# Patient Record
(demographics unavailable — no encounter records)

---
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
## 2018-06-14 NOTE — CT
CT OF CHEST WITHOUT CONTRAST:

 

Date:  06/14/18 

 

COMPARISON:  

12/20/16. 

 

HISTORY:  

COPD and personal history of nicotine dependence. Patient quit smoking 12 years ago. 

 

TECHNIQUE:  

Multiple contiguous axial images were obtained in a CT of the chest without contrast per low dose can
cer screening protocol. Sagittal and coronal reformats were performed. 

 

FINDINGS:

Emphysematous changes are seen throughout the lungs. No pulmonary nodules are identified. Scarring is
 seen in the right lung base. No pneumothorax or pleural effusions are seen. 

 

The heart is normal in size. Calcifications are seen in the coronary arteries and aorta. No hilar or 
mediastinal lymphadenopathy are seen. 

 

Degenerative changes are seen in the spine. The visualized subdiaphragmatic structures are unremarkab
le. The chest wall soft tissues are unremarkable. 

 

IMPRESSION: 

Lung-RADS Category 1 - Negative. 

 

 

POS: [facility]
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
## 2018-07-07 NOTE — RAD
CHEST 1 VIEW:

 

History 

Fever.

 

COMPARISON: 

12/1/16.

 

FINDINGS: 

Atherosclerosis of the aorta.  Normal cardiac silhouette.  The pulmonary vessels and pulmonary hilum 
are normal.  Costophrenic angles are clear.  Hyperinflation, without consolidation or mass.  No pneum
othorax or osseous abnormalities.

 

IMPRESSION: 

Atherosclerosis.  No acute cardiopulmonary process.

 

POS: [facility]

## 2018-07-08 NOTE — PDOC.PN
- Subjective


Encounter Start Date: 07/08/18


Encounter Start Time: 11:45


Subjective: pt up in chair family at bedside. pt is back to her baseline





- Objective


Resuscitation Status: 


 











Resuscitation Status           FULL:Full Resuscitation














Vital Signs & Weight: 


 Vital Signs (12 hours)











  Temp Pulse Resp BP BP Pulse Ox Pulse Ox


 


 07/08/18 14:33   91  16    95 


 


 07/08/18 14:19   91   154/77 H   


 


 07/08/18 10:50   91   136/75   


 


 07/08/18 10:29   91  16    95 


 


 07/08/18 09:40   80   136/75   


 


 07/08/18 08:35        100


 


 07/08/18 08:00  98.5 F  80  16    95 


 


 07/08/18 06:54       94 L 


 


 07/08/18 06:48   80  16    94 L 


 


 07/08/18 04:20  98.3 F  93  18   112/59 L  93 L 














  Pulse Ox


 


 07/08/18 14:33 


 


 07/08/18 14:19 


 


 07/08/18 10:50 


 


 07/08/18 10:29 


 


 07/08/18 09:40 


 


 07/08/18 08:35  95


 


 07/08/18 08:00 


 


 07/08/18 06:54 


 


 07/08/18 06:48 


 


 07/08/18 04:20 











I&O: 


 











 07/07/18 07/08/18 07/09/18





 06:59 06:59 06:59


 


Intake Total  1350 


 


Balance  1350 











Result Diagrams: 


 07/08/18 04:11





 07/08/18 04:11





Phys Exam





- Physical Examination


HEENT: PERRLA, moist MMs, sclera anicteric, TM's clear, oral pharynx no lesions

, 2+ tonsils


Neck: no nodes, no JVD, supple, full ROM


Respiratory: no wheezing, no rales, no rhonchi, wheezing present, clear to 

auscultation bilateral


Cardiovascular: RRR, no significant murmur, no rub, gallop, irregular


Gastrointestinal: soft, non-tender, no distention, positive bowel sounds


Musculoskeletal: no edema, pulses present, edema present





Dx/Plan


(1) Metabolic encephalopathy


Code(s): G93.41 - METABOLIC ENCEPHALOPATHY   Status: Acute   





(2) Fever


Code(s): R50.9 - FEVER, UNSPECIFIED   Status: Acute   





(3) Pneumonia


Code(s): J18.9 - PNEUMONIA, UNSPECIFIED ORGANISM   Status: Acute   





- Plan


pt feels much better today


-: will continue abx


-: ?viral etiology


-: will await echo if normal will discharge in am





* .








Review of Systems





- Review of Systems


ENT: negative: Ear Pain, Ear Discharge, Nose Pain, Nose Discharge, Nose 

Congestion, Mouth Pain, Mouth Swelling, Throat Pain, Throat Swelling, Other


Respiratory: negative: Cough, Dry, Shortness of Breath, Hemoptysis, SOB with 

Excertion, Pleuritic Pain, Sputum, Wheezing


Cardiovascular: negative: chest pain, palpitations, orthopnea, paroxysmal 

nocturnal dyspnea, edema, light headedness, other


Gastrointestinal: negative: Nausea, Vomiting, Abdominal Pain, Diarrhea, 

Constipation, Melena, Hematochezia, Other


Genitourinary: negative: Dysuria, Frequency, Incontinence, Hematuria, Retention

, Other





- Medications/Allergies


Allergies/Adverse Reactions: 


 Allergies











Allergy/AdvReac Type Severity Reaction Status Date / Time


 


Iodine and Iodide Containing Allergy Severe SEVERE Verified 05/25/17 13:52





Produc   ITCHING  


 


shellfish derived Allergy Severe SEVERE Verified 05/25/17 13:52





   VOMITING  











Medications: 


 Current Medications





Acetaminophen (Tylenol)  650 mg PO Q4H PRN


   PRN Reason: Headache/Fever or Pain


   Last Admin: 07/08/18 01:10 Dose:  650 mg


Albuterol/Ipratropium (Duoneb)  3 ml NEB A2AC-FU [facility]


   Last Admin: 07/08/18 14:33 Dose:  3 ml


Apixaban (Eliquis)  5 mg PO BID [facility]


   Last Admin: 07/08/18 09:40 Dose:  5 mg


Aspirin (Ecotrin)  81 mg PO DAILY [facility]


   Last Admin: 07/08/18 09:40 Dose:  81 mg


Famotidine (Pepcid)  20 mg PO DAILY [facility]


   Last Admin: 07/08/18 09:41 Dose:  20 mg


Hydralazine HCl (Apresoline)  25 mg PO TID [facility]


   Last Admin: 07/08/18 14:19 Dose:  25 mg


Ceftriaxone Sodium 1 gm/ (Sodium Chloride)  100 mls @ 200 mls/hr IVPB 1900 [facility]


   Last Admin: 07/07/18 22:08 Dose:  100 mls


Sodium Chloride (Normal Saline 0.9%)  1,000 mls @ 50 mls/hr IV .Q20H [facility]


   Stop: 07/08/18 15:29


   Last Admin: 07/07/18 20:35 Dose:  Not Given


Azithromycin 500 mg/ Sodium (Chloride)  250 mls @ 250 mls/hr IVPB Q24HR [facility]


   Last Admin: 07/07/18 23:17 Dose:  250 mls


Levothyroxine Sodium (Synthroid)  50 mcg PO [facility]


   Last Admin: 07/07/18 22:10 Dose:  50 mcg


Pravastatin Sodium (Pravachol)  40 mg PO [facility]


   Last Admin: 07/07/18 22:10 Dose:  40 mg


Senna (Senokot)  2 tab PO HSPRN PRN


   PRN Reason: Constipation


Sotalol HCl (Betapace)  80 mg PO BID [facility]


   Last Admin: 07/08/18 10:50 Dose:  80 mg
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
## 2018-07-09 NOTE — PDOC.PN
- Subjective


Encounter Start Date: 07/09/18


Encounter Start Time: 12:30


Subjective: pt up in bed felt weak this am





- Objective


Resuscitation Status: 


 











Resuscitation Status           FULL:Full Resuscitation














Vital Signs & Weight: 


 Vital Signs (12 hours)











  Temp Pulse Resp BP BP Pulse Ox


 


 07/09/18 20:37   93   168/98 H  


 


 07/09/18 18:35   99  12   


 


 07/09/18 16:20  98.3 F  92  19   145/78 H  96


 


 07/09/18 15:00   89   133/88  


 


 07/09/18 14:20   70  14   


 


 07/09/18 13:00  97.9 F     


 


 07/09/18 11:45  101.2 F H     


 


 07/09/18 10:38   95  18    92 L








 Weight











Admit Weight                   129 lb


 


Weight                         129 lb














I&O: 


 











 07/08/18 07/09/18 07/10/18





 06:59 06:59 06:59


 


Intake Total 1350 940 480


 


Balance 1350 940 480











Result Diagrams: 


 07/08/18 04:11





 07/08/18 04:11





Phys Exam





- Physical Examination


HEENT: PERRLA, moist MMs, sclera anicteric, TM's clear, oral pharynx no lesions

, 2+ tonsils


Neck: no nodes, no JVD, supple, full ROM


Respiratory: no wheezing, no rales, no rhonchi, wheezing present, clear to 

auscultation bilateral


Cardiovascular: RRR, no significant murmur, no rub, gallop, irregular


Gastrointestinal: soft, non-tender, no distention, positive bowel sounds





Dx/Plan


(1) Metabolic encephalopathy


Code(s): G93.41 - METABOLIC ENCEPHALOPATHY   Status: Acute   





(2) Fever


Code(s): R50.9 - FEVER, UNSPECIFIED   Status: Acute   





(3) Pneumonia


Code(s): J18.9 - PNEUMONIA, UNSPECIFIED ORGANISM   Status: Acute   





(4) Systolic and diastolic CHF, acute


Code(s): I50.41 - ACUTE COMBINED SYSTOLIC AND DIASTOLIC (CONGESTIVE) HRT FAIL   

Status: Acute   





(5) Systolic heart failure


Code(s): I50.20 - UNSPECIFIED SYSTOLIC (CONGESTIVE) HEART FAILURE   Status: 

Acute   





- Plan


per pt's family pt was confused this am


-: she also complained on having weakness all over


-: will consult cardiology and transfer her to monitored floor


-: She did have a temp of 101 but it is very warm in her room and 


-: this was a axillary temp since her oral was normal per nursing





* .


will continue abx for now i do not think this is bacterial infection since her 

procalcitonin is normal. possible viral. 





Review of Systems





- Review of Systems


Constitutional: weakness


Respiratory: Cough


Cardiovascular: negative: chest pain, palpitations, orthopnea, paroxysmal 

nocturnal dyspnea, edema, light headedness, other


Gastrointestinal: negative: Nausea, Vomiting, Abdominal Pain, Diarrhea, 

Constipation, Melena, Hematochezia, Other


Genitourinary: negative: Dysuria, Frequency, Incontinence, Hematuria, Retention

, Other





- Medications/Allergies


Allergies/Adverse Reactions: 


 Allergies











Allergy/AdvReac Type Severity Reaction Status Date / Time


 


Iodine and Iodide Containing Allergy Severe SEVERE Verified 05/25/17 13:52





Produc   ITCHING  


 


shellfish derived Allergy Severe SEVERE Verified 05/25/17 13:52





   VOMITING  











Medications: 


 Current Medications





Acetaminophen (Tylenol)  650 mg PO Q4H PRN


   PRN Reason: Headache/Fever or Pain


   Last Admin: 07/09/18 20:37 Dose:  650 mg


Albuterol/Ipratropium (Duoneb)  3 ml NEB [id number] [facility]


   Last Admin: 07/09/18 18:35 Dose:  3 ml


Apixaban (Eliquis)  5 mg PO BID [facility]


   Last Admin: 07/09/18 20:37 Dose:  5 mg


Aspirin (Ecotrin)  81 mg PO DAILY [facility]


   Last Admin: 07/09/18 08:08 Dose:  81 mg


Carvedilol (Coreg)  6.25 mg PO BID-[facility]


Famotidine (Pepcid)  20 mg PO DAILY [facility]


   Last Admin: 07/09/18 08:08 Dose:  20 mg


Hydralazine HCl (Apresoline)  25 mg PO TID [facility]


   Last Admin: 07/09/18 20:37 Dose:  25 mg


Azithromycin 500 mg/ Sodium (Chloride)  250 mls @ 250 mls/hr IVPB Q24HR [facility]


   Last Admin: 07/08/18 23:02 Dose:  250 mls


Ceftriaxone Sodium 1 gm/Miscellaneous Medication 1 each/ Sodium Chloride  100 

mls @ 200 mls/hr IVPB 1900 [facility]


   Last Admin: 07/09/18 18:46 Dose:  Not Given


Levothyroxine Sodium (Synthroid)  50 mcg PO HS [facility]


   Last Admin: 07/09/18 20:37 Dose:  50 mcg


Pravastatin Sodium (Pravachol)  40 mg PO HS [facility]


   Last Admin: 07/09/18 20:37 Dose:  40 mg


Senna (Senokot)  2 tab PO HSPRN PRN


   PRN Reason: Constipation

## 2018-07-10 NOTE — PDOC.PN
- Subjective


Encounter Start Date: 07/10/18


Encounter Start Time: 10:00


Subjective: pt up in bed feels very sob





- Objective


Resuscitation Status: 


 











Resuscitation Status           FULL:Full Resuscitation














Vital Signs & Weight: 


 Vital Signs (12 hours)











  Temp Pulse Resp BP Pulse Ox


 


 07/10/18 11:00  97.9 F  94  18  136/97 H  94 L


 


 07/10/18 09:44   89  14   96


 


 07/10/18 08:01  98.7 F  108 H  18  160/95 H  93 L


 


 07/10/18 08:00  98.7 F  70  18  


 


 07/10/18 07:59   81   


 


 07/10/18 06:30   81  12   96


 


 07/10/18 04:00  99.0 F  88  16  125/78  92 L








 Weight











Admit Weight                   129 lb


 


Weight                         120 lb 14.4 oz














I&O: 


 











 07/09/18 07/10/18 07/11/18





 06:59 06:59 06:59


 


Intake Total 940 830 


 


Output Total  520 


 


Balance 940 310 











Result Diagrams: 


 07/08/18 04:11





 07/08/18 04:11





Phys Exam





- Physical Examination


HEENT: PERRLA, moist MMs, sclera anicteric, TM's clear, oral pharynx no lesions

, 2+ tonsils


Neck: no nodes, no JVD, supple, full ROM


mild rhonchi to bases clears with cough


Cardiovascular: RRR, no significant murmur, no rub, gallop, irregular


Gastrointestinal: soft, non-tender, no distention, positive bowel sounds


Musculoskeletal: no edema, pulses present, edema present





Dx/Plan


(1) Metabolic encephalopathy


Code(s): G93.41 - METABOLIC ENCEPHALOPATHY   Status: Acute   





(2) Fever


Code(s): R50.9 - FEVER, UNSPECIFIED   Status: Acute   





(3) Pneumonia


Code(s): J18.9 - PNEUMONIA, UNSPECIFIED ORGANISM   Status: Acute   





(4) Systolic and diastolic CHF, acute


Code(s): I50.41 - ACUTE COMBINED SYSTOLIC AND DIASTOLIC (CONGESTIVE) HRT FAIL   

Status: Acute   





(5) Systolic heart failure


Code(s): I50.20 - UNSPECIFIED SYSTOLIC (CONGESTIVE) HEART FAILURE   Status: 

Acute   





(6) Weight loss


Status: Acute   





- Plan


pt still spiked a temp yest however this could be due to URI


-: no significant pna, ua negative. pt still has a wet cough.


-: will start her on low dose diuretic and heart failure meds


-: spoke with her pcp who stated she was going to sent her to gi for weight 


-: loss. will consult gi. possible viral etiology for her worsening HF? last





* .


last ef was 50-55%





Review of Systems





- Review of Systems


Constitutional: weakness


ENT: negative: Ear Pain, Ear Discharge, Nose Pain, Nose Discharge, Nose 

Congestion, Mouth Pain, Mouth Swelling, Throat Pain, Throat Swelling, Other


Respiratory: negative: Cough, Dry, Shortness of Breath, Hemoptysis, SOB with 

Excertion, Pleuritic Pain, Sputum, Wheezing


Cardiovascular: negative: chest pain, palpitations, orthopnea, paroxysmal 

nocturnal dyspnea, edema, light headedness, other


Gastrointestinal: negative: Nausea, Vomiting, Abdominal Pain, Diarrhea, 

Constipation, Melena, Hematochezia, Other





- Medications/Allergies


Allergies/Adverse Reactions: 


 Allergies











Allergy/AdvReac Type Severity Reaction Status Date / Time


 


Iodine and Iodide Containing Allergy Severe SEVERE Verified 05/25/17 13:52





Produc   ITCHING  


 


shellfish derived Allergy Severe SEVERE Verified 05/25/17 13:52





   VOMITING  











Medications: 


 Current Medications





Acetaminophen (Tylenol)  650 mg PO Q4H PRN


   PRN Reason: Headache/Fever or Pain


   Last Admin: 07/10/18 00:21 Dose:  650 mg


Albuterol/Ipratropium (Duoneb)  3 ml NEB Q4H PRN


   PRN Reason:  SOB


Apixaban (Eliquis)  5 mg PO BID [facility]


   Last Admin: 07/10/18 07:59 Dose:  5 mg


Aspirin (Ecotrin)  81 mg PO DAILY [facility]


   Last Admin: 07/10/18 07:59 Dose:  81 mg


Carvedilol (Coreg)  6.25 mg PO BID-[facility]


   Last Admin: 07/10/18 07:59 Dose:  6.25 mg


Famotidine (Pepcid)  20 mg PO DAILY [facility]


   Last Admin: 07/10/18 07:59 Dose:  20 mg


Hydralazine HCl (Apresoline)  25 mg PO TID [facility]


   Last Admin: 07/10/18 07:59 Dose:  25 mg


Levofloxacin (Levaquin)  500 mg PO 0600 [facility]


Levothyroxine Sodium (Synthroid)  50 mcg PO HS [facility]


   Last Admin: 07/09/18 20:37 Dose:  50 mcg


Lisinopril (Zestril)  10 mg PO BID [facility]


Pravastatin Sodium (Pravachol)  40 mg PO HS [facility]


   Last Admin: 07/09/18 20:37 Dose:  40 mg


Senna (Senokot)  2 tab PO HSPRN PRN


   PRN Reason: Constipation


Sodium Chloride (Flush - Normal Saline)  10 ml IVF Q12HR [facility]


Sodium Chloride (Flush - Normal Saline)  10 ml IVF PRN PRN


   PRN Reason: Saline Flush

## 2018-07-10 NOTE — CON
DATE OF CONSULTATION:  07/09/2018

 

HISTORY OF PRESENT ILLNESS:  Sveta Cuevas is a 78-year-old white female, who is 
followed by Dr. Scott in the past.  In 05/2017, she underwent cardioversion for 
atrial fibrillation/flutter and has been maintained on sotalol.  She was last 
seen in the office on 07/05/2018.  EKG on that day revealed sinus arrhythmia 
with heart rate of 67 per minute.  Echocardiogram in 02/2018, revealed ejection 
fraction of 50% to 55% with probable diastolic dysfunction, mild left atrial 
enlargement, mild mitral regurgitation, mild mitral annular calcification, mild 
tricuspid regurgitation, and mild pulmonic regurgitation.

 

The patient states that she has been having a cough for several weeks and at 
times productive of green colored sputum.  She saw her primary physician on 07/
05/2018 and was prescribed Augmentin.  Her family found her to be more confused 
and not answering the phone on 07/07/2018 and she was taken to the emergency 
room in [place], and had a fever of 102 degrees.  She was transferred here 
for further evaluation.  She does complain of some pleuritic chest pain at 
times with coughing.

 

PAST MEDICAL HISTORY:  History of atrial fibrillation/flutter, hyperlipidemia, 
hypertension, hypothyroidism, chronic kidney disease, prediabetes, arthritis.

 

OPERATIONS:  Brain aneurysm surgery, cataract surgery, and hysterectomy.

 

MEDICATIONS:  Eliquis 5 mg b.i.d. (the patient states she has been compliant 
with this), sotalol 80 mg b.i.d., hydralazine 25 mg t.i.d., pravastatin 40 at 
bedtime, levothyroxine 50 daily, aspirin 81 daily.

 

ALLERGIES:  IODINE which causes pruritus and SHELLFISH.

 

SOCIAL HISTORY:  She states she stopped smoking 12 years ago and smoked 10-12 
packs per day.  She was asked about this multiple times, but continued to state 
she smoked 10-12 packs per day.  She does not drink alcohol.

 

FAMILY HISTORY:  Unremarkable.

 

REVIEW OF SYSTEMS:  As noted above, otherwise negative.

 

PHYSICAL EXAMINATION:

VITAL SIGNS:  Blood pressure 145/78, pulse of 99.

HEENT:  PERRL.

NECK:  Supple.

CHEST:  Clear, without rhonchi or wheezing.

CARDIOVASCULAR:  S1, S2 normal, without any S3 or S4.  The rhythm is 
irregularly irregular.

ABDOMEN:  Normal bowel sounds without tenderness.

EXTREMITIES:  Revealed chronic stasis changes, but no edema.

NEUROLOGIC:  Grossly intact.

 

LABORATORY AND DIAGNOSTIC DATA:  EKG revealed probable atrial flutter with 
heart rate of 97 per minute.  Echocardiogram was technically difficult with 
ejection fraction of 25% to 30%, mildly dilated left atrium, mildly increased 
left ventricular size, thickened aortic valve leaflets, and mild mitral 
regurgitation.  Hemoglobin 13.5, hematocrit 41.7, white count 6400, platelets 
122,000.  Sodium 137, potassium 3.1, chloride 104, carbon dioxide 22, BUN 14, 
creatinine 1.04, glucose 173.  TSH is normal.  Troponin I is normal.  CK-MB is 
normal.  BNP 1034.1.  Chest x-ray revealed no acute cardiopulmonary process.  
Chest CTA revealed no evidence of thromboembolism.  There are emphysematous 
changes.  There are calcifications of the coronary arteries and the aorta.

 

IMPRESSION:

1.  Probable chronic obstructive pulmonary disease exacerbation with increased 
sputum productive of green sputum, but no definite infiltrate seen on chest x-
ray.  She has been febrile up to 102 in [place] and 101.2 here.

2.  Recurrence of atrial flutter.  She apparently underwent cardioversion in 05/
2017 and had maintained sinus rhythm since that time.

3.  Severe left ventricular dysfunction with ejection fraction of 25% to 30%, 
whereas in the past her ejection fraction has been normal.

4.  Former smoker.

5.  Chronic obstructive pulmonary disease.

6.  Hypertension.

7.  Hyperlipidemia.

8.  Chronic kidney disease.

 

PLAN:  She currently is on Rocephin and Zithromax and receiving DuoNebs.  With 
her severe left ventricular dysfunction, I will discontinue the sotalol instead 
place her on carvedilol.  Amiodarone may need to be added.  Eliquis will be 
continued.  We will follow the patient with you.

 

CASSIDY

## 2018-07-10 NOTE — PDOC.CTH
<Kaley Moreno - Last Filed: 07/10/18 15:44>





Cardiology Progress Note





- Subjective





The pt seen and examined.  No overnight events.  No cardiac complaints.  She 

could follow commands; however, remains confused. 





- Objective


 Vital Signs











  Temp Pulse Resp BP Pulse Ox


 


 07/10/18 15:15   94   


 


 07/10/18 11:00  97.9 F  94  18  136/97 H  94 L


 


 07/10/18 09:44   89  14   96


 


 07/10/18 08:01  98.7 F  108 H  18  160/95 H  93 L


 


 07/10/18 08:00  98.7 F  70  18  


 


 07/10/18 07:59   81   


 


 07/10/18 06:30   81  12   96


 


 07/10/18 04:00  99.0 F  88  16  125/78  92 L








 











Admit Weight                   129 lb


 


Weight                         120 lb 14.4 oz














 











 07/09/18 07/10/18 07/11/18





 06:59 06:59 06:59


 


Intake Total 940 830 


 


Output Total  520 


 


Balance 940 310 














- Physical Examination


Lungs: other: (coarses and diminished at bases)


Heart: other: (irregular)


Extremities: other: (1-2+ pitting edema)





- Telemetry


Telemetry Rhythm: Aflutter 80-90s





- Labs


Result Diagrams: 


 07/08/18 04:11





 07/08/18 04:11


 Troponin/CKMB











CK-MB (CK-2)  0.7 ng/mL (0-6.6)   07/07/18  23:31    


 


Troponin I  0.010 ng/mL (< 0.028)   07/07/18  23:31    














- Assessment/Plan





1. Paroxysmal Aflutter - Rate well controlled with Coreg and Sotalol.  On 

Eliquis 5mg BID.  Cont. to monitor on tele.


2. New onset of Systolic HF - Echo on 07/09/18 showed EF 25-30% (which EF 50-55

% in 02/2018); On Coreg 3.125mg BID and Lisinopril 10mg BID; cont. to monitor


3. Low grade temp - No evidence of PNA, UA is negative.   


4. HTN - stable with current med; cont monitor


5. Hyperlipidemia - on Statin


6. Hypothyroidism - On Levothyroxin


7. CKD - stable 


MAR reviewed





Eho on 07/09/18 showed EF 25-30%, mild dilated LA, mildly LVH, mild TR.





Review of Systems





- Review of Systems


Constitutional: reports: no symptoms reported, see HPI


EENTM: reports: no symptoms reported, see HPI


Respiratory: reports: no symptoms reported, see HPI


Cardiac (ROS): reports: no symptoms reported, see HPI


ABD/GI: reports: no symptoms reported, see HPI





<YOLA Scott Sascha - Last Filed: 07/10/18 18:42>





Cardiology Progress Note





- Objective


 Vital Signs











  Temp Pulse Resp BP Pulse Ox


 


 07/10/18 15:15  98.8 F  100  20  135/99 H  98


 


 07/10/18 11:00  97.9 F  94  18  136/97 H  94 L


 


 07/10/18 09:44   89  14   96


 


 07/10/18 08:01  98.7 F  108 H  18  160/95 H  93 L


 


 07/10/18 08:00  98.7 F  70  18  


 


 07/10/18 07:59   81   








 











Admit Weight                   129 lb


 


Weight                         120 lb 14.4 oz














 











 07/09/18 07/10/18 07/11/18





 06:59 06:59 06:59


 


Intake Total 940 830 600


 


Output Total  520 


 


Balance 940 310 600














- Labs


Result Diagrams: 


 07/08/18 04:11





 07/08/18 04:11


 Troponin/CKMB











CK-MB (CK-2)  0.7 ng/mL (0-6.6)   07/07/18  23:31    


 


Troponin I  0.010 ng/mL (< 0.028)   07/07/18  23:31    














- Assessment/Plan





Pt. seen and eval. by me. I agree with the A/P by the NP.Consider cardioversion

## 2018-07-10 NOTE — PQF
CLINICAL DOCUMENTATION IMPROVEMENT CLARIFICATION FORM:  ICD-10 Updated



PLEASE DO AN ADDENDUM TO THE PROGRESS NOTE WITH ANY DOCUMENTATION UPDATES OR 
ADDITIONS AND CARRY THROUGH TO DC SUMMARY.   THANK YOU.



DATE:       7/10/18                                                     ATTN:   
DR. WHITNEY



Please exercise your independent, professional judgment in responding to the 
clarification form. 

Clinical indicators are provided on the bottom of this form for your review



Please check appropriate box(es):

 [  ] Sepsis due to: (Pna, UTI, gangrenous gall bladder, etc.) _________________
__________

                 Due to:  [  ] Device (please specify) _________________________
_____________  

         [  ] Implant   [  ] Graft   [  ] Infusion      

[ x ] SIRS due to non-infectious process (please specify etiology) _____________
____________

[  ] with organ dysfunction     [  ] without organ dysfunction

[  ] Severe sepsis with acute organ dysfunction of: ____________________________
_______

        (Examples: respiratory failure, encephalopathy, acute kidney failure, 
other)

[  ] Septic Shock

[  ] Localized infection without sepsis

[  ] Other diagnosis ___________

[  ] Unable to determine



In addition, please specify:

Present on Admission (POA):  [  x] Yes             [  ] No             [  ] 
Unable to determine



For continuity of documentation, please document condition throughout progress 
notes and discharge summary.  Thank You.



CLINICAL INDICATORS - SIGNS / SYMPTOMS / LABS



INITIAL TEMP IN ER- 102.9 / 102.3

PULSE 108 / 141

LACTIC ACID 2.6 PER H&P

CONFUSION AND LETHARGY PER H&P



RISKS:

PNEUMONIA



TREATMENT:

IV VANCOMYCIN (ER)

IV FLUIDS (ER)

LEVAQUIN (START 7/11)

TELEMETRY MONITORING

BLOOD AND URINE CULTURES



(This form is maintained as a part of the permanent medical record)

 2015 [employer].  All Rights Reserved

[email or web address]    Office:  [phone]



CASSIDY
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
## 2018-07-11 NOTE — PDOC.CTH
<Kaley Moreno - Last Filed: 07/11/18 12:02>





Cardiology Progress Note





- Subjective





The pt seen and examined.  No overnight events.  No cardiac complaints.  She is 

alerted and oriented x4 today. 





- Objective


 Vital Signs











  Temp Pulse Resp BP BP Pulse Ox


 


 07/11/18 11:30  98.6 F  94  18   133/76  96


 


 07/11/18 08:33   89   168/98 H  


 


 07/11/18 08:25  97.9 F  92  18   121/71  100


 


 07/11/18 06:05       90 L


 


 07/11/18 06:00    24 H    87 L


 


 07/11/18 03:12  98.1 F  89  16   126/68  94 L


 


 07/11/18 03:10    16    85 L


 


 07/11/18 00:39       95


 


 07/11/18 00:19  97.9 F  102 H  20   110/60  97








 











Admit Weight                   129 lb


 


Weight                         119 lb 8 oz














 











 07/10/18 07/11/18 07/12/18





 06:59 06:59 06:59


 


Intake Total 830 1220 


 


Output Total 520 400 


 


Balance 310 820 














- Physical Examination


General/Neuro: alert & oriented x3


Neck: no JVD present


Lungs: other: (diminished at bases)


Heart: other: (irregular)


Abdomen: soft


Extremities: other: (No edema)





- Telemetry


Telemetry Rhythm: Aflutter 90-100s





- Labs


Result Diagrams: 


 07/11/18 04:59





 07/11/18 04:59


 Troponin/CKMB











CK-MB (CK-2)  0.7 ng/mL (0-6.6)   07/07/18  23:31    


 


Troponin I  0.010 ng/mL (< 0.028)   07/07/18  23:31    














- Assessment/Plan





1. Paroxysmal Aflutter - Rate well controlled with Coreg and Sotalol.  On 

Eliquis 5mg BID.  Possible cardioversion.  Cont. to monitor on tele.


2. New onset of Systolic HF - Echo on 07/09/18 showed EF 25-30% (which EF 50-55

% in 02/2018); On Coreg 3.125mg BID and Lisinopril 10mg BID; cont. to monitor


3. Low grade temp - No evidence of PNA, UA is negative.   


4. HTN - stable with current med; cont monitor


5. Hyperlipidemia - on Statin


6. Hypothyroidism - On Levothyroxin


7. CKD - stable 


MAR reviewed





Eho on 07/09/18 showed EF 25-30%, mild dilated LA, mildly LVH, mild TR.








Review of Systems





- Review of Systems


Constitutional: reports: no symptoms reported


EENTM: reports: no symptoms reported


Respiratory: reports: no symptoms reported


Cardiac (ROS): reports: no symptoms reported


ABD/GI: reports: no symptoms reported


: reports: no symptoms reported


Musculoskeletal: reports: no symptoms reported


Skin: reports: no symptoms reported





<YOLA Scott - Last Filed: 07/11/18 17:34>





Cardiology Progress Note





- Objective


 Vital Signs











  Temp Pulse Pulse Resp BP BP BP


 


 07/11/18 16:30  97.7 F  103 H   18    119/70


 


 07/11/18 13:47    97    125/82 


 


 07/11/18 11:30  98.6 F  94   18    133/76


 


 07/11/18 08:33   89    168/98 H  


 


 07/11/18 08:25  97.9 F  92   18    121/71


 


 07/11/18 06:05       


 


 07/11/18 06:00     24 H   














  Pulse Ox Pulse Ox


 


 07/11/18 16:30  92 L 


 


 07/11/18 13:47   95


 


 07/11/18 11:30  96 


 


 07/11/18 08:33  


 


 07/11/18 08:25  100 


 


 07/11/18 06:05  90 L 


 


 07/11/18 06:00  87 L 








 











Admit Weight                   129 lb


 


Weight                         119 lb 8 oz














 











 07/10/18 07/11/18 07/12/18





 06:59 06:59 06:59


 


Intake Total 830 1220 


 


Output Total 520 400 


 


Balance 310 820 














- Labs


Result Diagrams: 


 07/11/18 04:59





 07/11/18 04:59


 Troponin/CKMB











CK-MB (CK-2)  0.7 ng/mL (0-6.6)   07/07/18  23:31    


 


Troponin I  0.010 ng/mL (< 0.028)   07/07/18  23:31    














- Assessment/Plan





Pt. seen and eval. by me. I agree with the A/P by the NP. I will discuss with 

EP and see if cardioversion vs ablation would be better. She is feeling better 

each day.


Chest: right basilar rales/rhonchi. 


CV: regular. Monitor : a-flutter.

## 2018-07-11 NOTE — PQF
CLINICAL DOCUMENTATION IMPROVEMENT CLARIFICATION FORM:  ICD-10 Updated



PLEASE DO AN ADDENDUM TO THE PROGRESS NOTE WITH ANY DOCUMENTATION UPDATES OR 
ADDITIONS AND CARRY THROUGH TO DC SUMMARY.   THANK YOU.



Date:           7/11/18                                                 ATTN:  
DR. WHITNEY



Please exercise your independent, professional judgment in responding to the 
clarification form. 

Clinical indicators are provided on the bottom of this form for your review



Please check appropriate box(s):

[  ] Protein Calorie Malnutrition:    [  ] Mild      [ x ] Moderate   [  ] 
Severe   

[  ] Other Malnutrition (please specify) _______________________________________
__

[  ] Underweight without malnutrition

[  ] Cachexia 

[  ] Other diagnosis ___________

[  ] Unable to determine



In addition, please specify:

Present on Admission (POA):  [ x ] Yes             [  ] No             [  ] 
Unable to determine



CLINICAL INDICATORS - SIGNS / SYMPTOMS / LABS





DIETARY NOTE 7/9: "KCAL AND G PROTEIN LIKELY NOT MET"

" NUTRITION DIAGNOSIS- MALNUTRITION RELATED TO CHRONIC POOR APPETITE / AS 
EVIDENCED BY PT NOTES LOW INTAKE AND 11% WT LOSS OVER LAST 2 MONTHS"



BMI 21



RISKS:

H/O   COPD

H/O CHF

H/O RENAL DISEASE

ADVANCED AGE



TREATMENT:

DIETARY CONSULT

GI CONSULT

RECOMMENDATIONS FOR NUTRITIONAL SUPPLEMENTS AND CONSIDERATION OF APPETITE 
STIMULANT





Moderate Malnutrition (in acute illness)

Energy Intake: <75% of estimated energy requirement for > 7 days

Weight Loss:  1-2%/1 week;  5%/ 1 month; 7.5%/3 months

Other: mild body fat loss; mild muscle mass loss; mild fluid accumulation; 

Severe Malnutrition (in acute illness)

Energy Intake: < 50% of estimated energy requirement for > 5 days

Weight Loss: >1-2%/1 week; >5%/1 month; >7.5%/3 months

Other: moderate body fat loss; moderate muscle mass loss; moderate- severe 
fluid accumulation; measurably reduced  strength

Moderate Malnutrition (in chronic illness)

Energy Intake: <75% of estimated energy requirement for >1 month

Weight Loss: 5%/1 month; 7.5%/3 months; 10%/6 months; 20%/1 year

Other: mild body fat loss; mild muscle mass loss; mild fluid accumulation

Severe Malnutrition (in chronic illness)

Energy Intake: <75% of estimated energy requirement for >1 month

Weight Loss: >5%/1 month; >7.5%/3 months; >10%/6 months; >20%/1 year

Other: severe body fat loss; severe muscle mass loss; severe fluid accumulation
; measurably reduced  strength









(This form is maintained as a part of the permanent medical record)

 2015 [employer].  All Rights Reserved

[email or web address]    Office:  [phone]

                                                              

 

[facility]

## 2018-07-12 NOTE — EKG
Test Reason : PREOP CARDIOVERSION

Blood Pressure : ***/*** mmHG

Vent. Rate : 108 BPM     Atrial Rate : 441 BPM

   P-R Int : 000 ms          QRS Dur : 102 ms

    QT Int : 312 ms       P-R-T Axes : 000 069 253 degrees

   QTc Int : 418 ms

 

Atrial tachycardia with rapid ventricular response

Possible Inferior infarct (cited on or before 12-NOV-2015)



Abnormal ECG

Confirmed by ERNESTO GRIER ([id number]) on 7/12/2018 4:27:26 PM

 

Referred By:  NATALI           Confirmed By:ERNESTO GRIER

## 2018-07-12 NOTE — EKG
Test Reason : POST CARDIOVERSION-BACK IN AFIB/FLUTTER

Blood Pressure : ***/*** mmHG

Vent. Rate : 098 BPM     Atrial Rate : 234 BPM

   P-R Int : 000 ms          QRS Dur : 102 ms

    QT Int : 380 ms       P-R-T Axes : 000 051 190 degrees

   QTc Int : 485 ms

 

Atrial tachycardia

Cannot rule out Inferior infarct (cited on or before 12-NOV-2015)



Abnormal ECG

Confirmed by ERNESTO GRIER ([id number]) on 7/12/2018 4:28:46 PM

 

Referred By:  ESTHER           Confirmed By:ERNESTO GRIER

## 2018-07-12 NOTE — CON
DATE OF CONSULTATION:  07/12/2018

 

ELECTROPHYSIOLOGY CONSULTATION REPORT 

 

REFERRING PHYSICIAN:  Dr. Scott

 

HISTORY:  I am seeing Ms. Cuevas  at our [facility] telemetry floor as 
an electrophysiology consult and her problems are:  

1.  Persistent atrial arrhythmias.

A.  History of persistent atrial fibrillation requiring cardioversion in 05/
2017 and sotalol suppression, now with recurrence.

B. Also, atypical atrial flutter also noted on EKGs.

2.  History of preserved LV function in the past, now 25-30% on the current 
echocardiogram from 12/24/2018.  

3.  History of chronic kidney disease with some acute exacerbation on admission.

4.  Possible chronic obstructive pulmonary disease exacerbation and sputum 
production.

5.  Urinary tract infection.

6.  Risk factors include hypertension and hyperlipidemia.

 

ALLERGIES:  IODINE CONTAINING PRODUCTS, SHELLFISH.

 

MEDICATIONS AT HOME:  Included omega 3 fatty acid, Meloxicam, cholecalciferol, 
aspirin, hydralazine, sotalol 80 mg twice a day, ranitidine, furosemide, 
fluvastatin, levothyroxine, cetirizine and apixaban 5 mg twice a day which the 
patient has been taking regularly.  Currently, patient is off sotalol and 
continued on apixaban, also on Coreg and Lasix for diuresis.

 

SUBJECTIVE:  Ms. Cuevas is somewhat confused.  History obtained from her friend 
at the bedside as well as from the notes.  This lady has been admitted with 
some cough and green colored sputum, also some worsening confusion from 
[facility].  She also noted to have elevated lactic acid initially, 
but no definite leukocytosis noted.  Some minor signs of UTI as well.  She had 
no dizziness, loss of consciousness, no stroke-like symptoms, no bleeding 
issues on admission.  She was treated with antibiotics for initially assumed 
pneumonia, although her CT of the chest was not suggestive of that.  She was 
though continued on antibiotics, levofloxacin for assumed chronic obstructive 
pulmonary disease exacerbation.  Her mental status, slightly improved back to 
baseline, still with mild confusion.  She has no PND or orthopnea at this point 
to suggest fluid overload.  No fever, chills or cough.  The rest of 12-point 
system otherwise unremarkable.

 

OBJECTIVE:

VITAL SIGNS:  Blood pressure is 131/79, heart rate 101, respirations 16, 
temperature 98.2 degrees Fahrenheit.

GENERAL:  Alert and oriented woman in no apparent distress.

NECK:  Supple.  Jugular veins not distended.

CHEST:  Coarse, no fine crackles, no wheezing appreciated.

CARDIAC:  Heart sounds are irregularly irregular.  S1, S2, variable.  No murmur 
or gallop.

ABDOMEN:  Benign.  Bowel sounds positive.

EXTREMITIES:  Lower extremities without edema, clubbing or cyanosis.

 

DATABASE:  EKG was reviewed.  Initial EKG reveals a coarse atrial fibrillation/
atypical flutter on presentation at rate of 97 beats per minute.  Subsequent 
EKGs reveals a similarly organized atypical atrial flutter type of picture.  
The initial QTC is measured to be 513 milliseconds, although somewhat difficult 
to evaluate due to the flutter waves underlying likely to be none there though.

 

LABORATORY DATA:  Sodium 136, potassium 3.3, BUN is 13, creatinine 0.84 today.  
The initial creatinine was 1.12.  Troponin I's are 0.01 on admission on the 
7th.  AST, ALT is in normal range.  TSH is 0.4.  Also, in normal range.  The 
chest CT was suggesting emphysema, no evidence of PE.  No definite 
consolidation seen to suggest pneumonia.  The echocardiogram reveals LV of 25-30
%, mild dilated left atrium, mild tricuspid regurgitation.

 

Microbiology shows yeast species in the urine.  No cultures noted.  

 

ASSESSMENT AND PLAN:  

1.  Ms. Cuevas is a pleasant 78-year-old woman with history of persistent atrial 
fibrillation, though with successful suppression of her atrial fibrillation 
subsequently after cardioversion in 2015 and also continue sotalol therapy and 
Eliquis for anticoagulation.  Previous she had normal LV function, but now on 
this admission, she was noted to have a marked reduction in her left 
ventricular function.  Her rates are reasonably controlled with Coreg.

 

I was consulted by Dr. Scott for arrhythmia management options.

 

Regarding her atrial arrhythmias, she seems to be in a coarse atrial 
fibrillation/atypical atrial flutter rhythm on the most EKGs.  I am not 
convinced this is a typical isthmus-dependent flutter based on the morphology.  
For this reason, I suspect a simple cavotricuspid ablation likely will not 
eliminate all her arrhythmias.  She likely will still need a suppressive 
antiarrhythmic medication.  The arrhythmia medication choices are somewhat 
limited.  Sotalol would not be unreasonable to be started even despite of her 
poor LV function.  Amiodarone would be also an option, although with her poor 
pulmonary function with heavy smoking in the past this could potentially put 
her at risk for significant pulmonary dysfunction if toxicity occurs with 
amiodarone.

 

I spoke to the son, Dr. Scott.  We agreed to pursue left invasive route at this 
point with a cardioversion today.

 

2.  Acute exacerbation of heart failure with LV dysfunction:  She has worsened 
LV function hence her atrial flutter with rapid rates as well as the possible 
infectious component also could contribute to the worsened LV function.  No 
high suspicion for ischemia based on normal troponins.  Continue optimization 
of heart failure management as per Dr. Scott.  If LVEF improves, likely will not 
need ICD, if remains poor even long-term she might be considered for ICD 
therapy in the future, although we have not discussed this yet with the family.

 

3.  Oral anticoagulation on full dose Eliquis currently.  Renal function is 
improving and based on her age, is still under 80 years old, it is reasonable 
to continue full dose Eliquis.  



4.  History of chronic confusion ever since her intracranial bleed and 
intracranial shunt placement in the past as per her family, unchanged.

 

5.  Renal insufficiency, now improving.  Continue to monitor.

 

Thank you for the consult.  Discussed with Dr. Scott, the family and the nursing 
staff as well.  We will follow with you.

 

CASSIDY

## 2018-07-12 NOTE — PDOC.PN
- Subjective


Encounter Start Date: 07/12/18


Encounter Start Time: 10:15


Subjective: pt up in bed states she feels well





- Objective


Resuscitation Status: 


 











Resuscitation Status           DNR:Do Not Resuscitate














Vital Signs & Weight: 


 Vital Signs (12 hours)











  Temp Pulse Resp BP Pulse Ox


 


 07/12/18 20:00  97.8 F  94  16   95


 


 07/12/18 15:55   94  16  141/69 H  99








 Weight











Admit Weight                   129 lb


 


Weight                         122 lb














I&O: 


 











 07/11/18 07/12/18 07/13/18





 06:59 06:59 06:59


 


Intake Total 1220 960 720


 


Output Total 400 1200 600


 


Balance 820 -240 120











Result Diagrams: 


 07/11/18 04:59





 07/12/18 08:43





Phys Exam





- Physical Examination


HEENT: PERRLA, moist MMs, sclera anicteric, TM's clear, oral pharynx no lesions

, 2+ tonsils


Neck: no nodes, no JVD, supple, full ROM


Respiratory: no wheezing, no rales, no rhonchi, wheezing present, clear to 

auscultation bilateral


Cardiovascular: RRR, no significant murmur, no rub, gallop, irregular


Gastrointestinal: soft, non-tender, no distention, positive bowel sounds





Dx/Plan


(1) Metabolic encephalopathy


Code(s): G93.41 - METABOLIC ENCEPHALOPATHY   Status: Acute   





(2) Fever


Code(s): R50.9 - FEVER, UNSPECIFIED   Status: Acute   





(3) Pneumonia


Code(s): J18.9 - PNEUMONIA, UNSPECIFIED ORGANISM   Status: Acute   





(4) Systolic and diastolic CHF, acute


Code(s): I50.41 - ACUTE COMBINED SYSTOLIC AND DIASTOLIC (CONGESTIVE) HRT FAIL   

Status: Acute   





(5) Systolic heart failure


Code(s): I50.20 - UNSPECIFIED SYSTOLIC (CONGESTIVE) HEART FAILURE   Status: 

Acute   





(6) Weight loss


Status: Acute   





- Plan


will continue abx day 5/7. s/p cardioversion without success


-: pt on HF meds. will get PT for possible placement


-: pt lives alone. 





* .








Review of Systems





- Review of Systems


ENT: negative: Ear Pain, Ear Discharge, Nose Pain, Nose Discharge, Nose 

Congestion, Mouth Pain, Mouth Swelling, Throat Pain, Throat Swelling, Other


Respiratory: negative: Cough, Dry, Shortness of Breath, Hemoptysis, SOB with 

Excertion, Pleuritic Pain, Sputum, Wheezing


Cardiovascular: negative: chest pain, palpitations, orthopnea, paroxysmal 

nocturnal dyspnea, edema, light headedness, other


Gastrointestinal: negative: Nausea, Vomiting, Abdominal Pain, Diarrhea, 

Constipation, Melena, Hematochezia, Other





- Medications/Allergies


Allergies/Adverse Reactions: 


 Allergies











Allergy/AdvReac Type Severity Reaction Status Date / Time


 


Iodine and Iodide Containing Allergy Severe SEVERE Verified 05/25/17 13:52





Produc   ITCHING  


 


shellfish derived Allergy Severe SEVERE Verified 05/25/17 13:52





   VOMITING  











Medications: 


 Current Medications





Acetaminophen (Tylenol)  650 mg PO Q4H PRN


   PRN Reason: Headache/Fever or Pain


   Last Admin: 07/11/18 11:33 Dose:  650 mg


Albuterol/Ipratropium (Duoneb)  3 ml NEB Q4H PRN


   PRN Reason:  SOB


Apixaban (Eliquis)  5 mg PO BID [facility]


   Last Admin: 07/11/18 21:07 Dose:  5 mg


Carvedilol (Coreg)  6.25 mg PO BID-[facility]


   Last Admin: 07/12/18 17:47 Dose:  6.25 mg


Diltiazem HCl (Cardizem Cd)  120 mg PO DAILY [facility]


Diltiazem HCl (Cardizem Cd)  180 mg PO DAILY [facility]


Famotidine (Pepcid)  20 mg PO DAILY [facility]


   Last Admin: 07/12/18 08:35 Dose:  20 mg


Furosemide (Lasix)  20 mg SLOW IVP 1100 [facility]


   Last Admin: 07/12/18 12:00 Dose:  Not Given


Furosemide (Lasix)  40 mg PO DAILY-[facility]


   Stop: 07/15/18 23:59


Hydralazine HCl (Apresoline)  25 mg PO TID [facility]


   Last Admin: 07/12/18 15:58 Dose:  25 mg


Levofloxacin (Levaquin)  500 mg PO 0600 [facility]


   Last Admin: 07/12/18 06:43 Dose:  500 mg


Levothyroxine Sodium (Synthroid)  50 mcg PO [facility]


   Last Admin: 07/11/18 21:08 Dose:  50 mcg


Lisinopril (Zestril)  10 mg PO BID [facility]


   Last Admin: 07/12/18 08:35 Dose:  10 mg


Pravastatin Sodium (Pravachol)  40 mg PO [facility]


   Last Admin: 07/11/18 21:08 Dose:  40 mg


Senna (Senokot)  2 tab PO HSPRN PRN


   PRN Reason: Constipation


Sodium Chloride (Flush - Normal Saline)  10 ml IVF Q12HR [facility]


   Last Admin: 07/12/18 08:35 Dose:  10 ml


Sodium Chloride (Flush - Normal Saline)  10 ml IVF PRN PRN


   PRN Reason: Saline Flush


   Last Admin: 07/10/18 21:27 Dose:  10 ml

## 2018-07-13 NOTE — PDOC.CTH
<Marilin Suárez - Last Filed: 07/13/18 12:11>





Cardiology Progress Note





- Subjective





EP Progress note:





patient seen an evaluated.  No new cardiac concerns or complaints. Feeling well 

after failed DCCV yesterday. 





Denies heart racing, palpitations, chest pain/pressure, stroke like symptoms, 

or passing out/lightheaded. Is not very active.





- Objective


 Vital Signs











  Temp Pulse Pulse Pulse Resp BP BP


 


 07/13/18 11:30  98.0 F  76    18  


 


 07/13/18 09:20    92  88   126/69  109/71


 


 07/13/18 07:45  98.3 F  86    18  


 


 07/13/18 07:38  98.3 F  86    18  


 


 07/13/18 04:22  97.2 F L  76    14  














  BP Pulse Ox


 


 07/13/18 11:30  127/67  92 L


 


 07/13/18 09:20  


 


 07/13/18 07:45   96


 


 07/13/18 07:38  137/75  96


 


 07/13/18 04:22  112/56 L  98








 











Admit Weight                   129 lb


 


Weight                         114 lb














 











 07/12/18 07/13/18 07/14/18





 06:59 06:59 06:59


 


Intake Total 960 960 


 


Output Total 1200 1200 


 


Balance -240 -240 














- Physical Examination


General/Neuro: alert & oriented x3, NAD


Neck: no JVD present


Lungs: CTA, unlabored respirations


Heart: other: (AFib)


Abdomen: NT/ND, soft





- Telemetry


Telemetry Rhythm: Atrial fibrillation





- Labs


Result Diagrams: 


 07/13/18 05:23





 07/13/18 05:23


 Troponin/CKMB











CK-MB (CK-2)  0.7 ng/mL (0-6.6)   07/07/18  23:31    


 


Troponin I  0.010 ng/mL (< 0.028)   07/07/18  23:31    














- Assessment/Plan





1.Persistent atrial fibrillation and atypical atrial flutter refractory to DCCV 

and sotalol. Failed CV yesterday and sotalol DCd in favor of rate control 

approach. She is a poor candidate for PVAI with her somewhat frail medical 

status and AAD drug therapy is limited. Amiodarone should be avoided with her 

chronic lung disease.  Rates moderately well controlled and mostly 85-100bpm on 

Coreg 6.25mg BID and Cartia 180mg daily. 





2. CHADS2-VASC: 5 (age, gender, HTN, HF) continue OAC, on Eliquis 5mg BID ( 

will reduce to 2.5mg once she is 80 or if kidney function worsens)





3. Cardiomyopathy, newly found reduced EF 25-30%, likely tachycardia mediated. 

Medical management by cardiology and re-evaluated 3 months, after rate control 

is achieved. 





4. Chronic altered mental status





5. Renal insufficiency





Rate control is sufficient. OK for DC by EP. Signing off. Please let us know if 

further assistance is needed. 





<Colt Katz - Last Filed: 07/13/18 15:01>





Cardiology Progress Note





- Objective


 Vital Signs











  Temp Pulse Pulse Pulse Resp BP BP


 


 07/13/18 11:30  98.0 F  76    18  


 


 07/13/18 09:20    92  88   126/69  109/71


 


 07/13/18 07:45  98.3 F  86    18  


 


 07/13/18 07:38  98.3 F  86    18  


 


 07/13/18 04:22  97.2 F L  76    14  














  BP Pulse Ox


 


 07/13/18 11:30  127/67  92 L


 


 07/13/18 09:20  


 


 07/13/18 07:45   96


 


 07/13/18 07:38  137/75  96


 


 07/13/18 04:22  112/56 L  98








 











Admit Weight                   129 lb


 


Weight                         114 lb














 











 07/12/18 07/13/18 07/14/18





 06:59 06:59 06:59


 


Intake Total 960 960 


 


Output Total 1200 1200 


 


Balance -240 -240 














- Labs


Result Diagrams: 


 07/13/18 05:23





 07/13/18 05:23


 Troponin/CKMB











CK-MB (CK-2)  0.7 ng/mL (0-6.6)   07/07/18  23:31    


 


Troponin I  0.010 ng/mL (< 0.028)   07/07/18  23:31    














Attending Addendum





- Attending Addendum


Date/Time: 07/13/18 1500





I personally evaluated the patient and discussed the management with Ms Suárez.


I agree with the History, Examination, Assessment and Plan documented above 

with any addition or exceptions noted below.

## 2018-07-13 NOTE — PDOC.CTH
<Kaley Moreno - Last Filed: 07/13/18 13:33>





Cardiology Progress Note





- Subjective





The pt seen and examined.  No overnight events.  No cardiac complaints.  She 

has slightly more appetite today.  She has been up to chair 2 hrs and walked 

with PT today.





- Objective


 Vital Signs











  Temp Pulse Pulse Pulse Resp BP BP


 


 07/13/18 11:30  98.0 F  76    18  


 


 07/13/18 09:20    92  88   126/69  109/71


 


 07/13/18 07:45  98.3 F  86    18  


 


 07/13/18 07:38  98.3 F  86    18  


 


 07/13/18 04:22  97.2 F L  76    14  














  BP Pulse Ox


 


 07/13/18 11:30  127/67  92 L


 


 07/13/18 09:20  


 


 07/13/18 07:45   96


 


 07/13/18 07:38  137/75  96


 


 07/13/18 04:22  112/56 L  98








 











Admit Weight                   129 lb


 


Weight                         114 lb














 











 07/12/18 07/13/18 07/14/18





 06:59 06:59 06:59


 


Intake Total 960 960 


 


Output Total 1200 1200 


 


Balance -240 -240 














- Physical Examination


General/Neuro: alert & oriented x3


Neck: no JVD present


Lungs: CTA (diminished at bases)


Heart: other: (irregular)


Abdomen: soft


Extremities: other: (No edema)





- Telemetry


Telemetry Rhythm: AFib/Aflutter 80-90s





- Labs


Result Diagrams: 


 07/13/18 05:23





 07/13/18 05:23


 Troponin/CKMB











CK-MB (CK-2)  0.7 ng/mL (0-6.6)   07/07/18  23:31    


 


Troponin I  0.010 ng/mL (< 0.028)   07/07/18  23:31    














- Assessment/Plan





1. Paroxysmal Aflutter/Afib - S/p Cardioversion on 07/12/18, which she returned 

back to Afib/Aflutter same day.  Rate well controlled with Coreg and Diltiazem.

  On Eliquis 5 mg BID. Cont. to monitor on tele.


2. New onset of Systolic HF - Echo on 07/09/18 showed EF 25-30% (which EF 50-55

% in 02/2018); On Coreg 3.125 mg BID and Lisinopril 10mg BID; cont. to monitor


3. Low grade temp - No evidence of PNA, UA is negative.   


4. HTN - stable with current med; cont monitor


5. Hyperlipidemia - on Statin


6. Hypothyroidism - On Levothyroxin


7. CKD - stable 


MAR reviewed





* Eho on 07/09/18 showed EF 25-30%, mild dilated LA, mildly LVH, mild TR.


* Per EP, ok to d/c.








Review of Systems





- Review of Systems


Constitutional: reports: weakness


EENTM: reports: no symptoms reported


Respiratory: reports: no symptoms reported


Cardiac (ROS): reports: no symptoms reported


ABD/GI: reports: poor appetite


: reports: no symptoms reported





<YOLA Scott - Last Filed: 07/13/18 21:08>





Cardiology Progress Note





- Objective


 Vital Signs











  Temp Pulse Pulse Pulse Resp BP BP


 


 07/13/18 19:00  98.6 F  86    14  


 


 07/13/18 17:00       


 


 07/13/18 15:57   85    17  


 


 07/13/18 11:30  98.0 F  76    18  


 


 07/13/18 09:20    92  88   126/69  109/71














  BP Pulse Ox


 


 07/13/18 19:00  124/67  93 L


 


 07/13/18 17:00   92 L


 


 07/13/18 15:57  138/67  95


 


 07/13/18 11:30  127/67  92 L


 


 07/13/18 09:20  








 











Admit Weight                   129 lb


 


Weight                         114 lb














 











 07/12/18 07/13/18 07/14/18





 06:59 06:59 06:59


 


Intake Total 960 960 960


 


Output Total 1200 1200 600


 


Balance -240 -240 360














- Labs


Result Diagrams: 


 07/13/18 05:23





 07/13/18 05:23


 Troponin/CKMB











CK-MB (CK-2)  0.7 ng/mL (0-6.6)   07/07/18  23:31    


 


Troponin I  0.010 ng/mL (< 0.028)   07/07/18  23:31    














- Assessment/Plan





Pt. was seen and eval. by me. I agree with the A/P by the NP Tiffanie. I 

appreciate the assistance from EP. Plan for NH placement at least temporarily 

and when more stable then consider repeat attempt to cardiovert. Plan to switch 

to Sotolol 5-7 days after stopping antibiotics. This may be more likely to 

maintyain sinus rhythm after repeat cardioversion.

## 2018-07-13 NOTE — PDOC.PN
- Subjective


Encounter Start Date: 07/13/18


Encounter Start Time: 13:00





Patient is seen today, alert and oriented. No other Concerns noted. Discussed 

with family at bedside. Pt admitted with CHF/ Afib with RVR.  





- Objective


Resuscitation Status: 


 











Resuscitation Status           DNR:Do Not Resuscitate














MAR Reviewed: Yes


Vital Signs & Weight: 


 Vital Signs (12 hours)











  Temp Pulse Pulse Pulse Resp BP BP


 


 07/13/18 17:00       


 


 07/13/18 15:57   85    17  


 


 07/13/18 11:30  98.0 F  76    18  


 


 07/13/18 09:20    92  88   126/69  109/71


 


 07/13/18 07:45  98.3 F  86    18  


 


 07/13/18 07:38  98.3 F  86    18  














  BP Pulse Ox


 


 07/13/18 17:00   92 L


 


 07/13/18 15:57  138/67  95


 


 07/13/18 11:30  127/67  92 L


 


 07/13/18 09:20  


 


 07/13/18 07:45   96


 


 07/13/18 07:38  137/75  96








 Weight











Admit Weight                   129 lb


 


Weight                         114 lb














I&O: 


 











 07/12/18 07/13/18 07/14/18





 06:59 06:59 06:59


 


Intake Total 960 960 


 


Output Total 1200 1200 


 


Balance -240 -240 











Result Diagrams: 


 07/13/18 05:23





 07/13/18 05:23


Radiology Reviewed by me: Yes





Phys Exam





- Physical Examination


HEENT: PERRLA, moist MMs


Neck: no nodes, no JVD


Respiratory: no wheezing, no rales


Cardiovascular: RRR, no significant murmur


Gastrointestinal: soft, non-tender


Musculoskeletal: no edema, pulses present


Neurological: non-focal, normal sensation


Lymphatic: no nodes





Dx/Plan


(1) Atrial fibrillation with RVR


Code(s): I48.91 - UNSPECIFIED ATRIAL FIBRILLATION   Status: Acute   Comment: PT 

on Po cardizem, Will cotninue with Cardiology recommedations, pt has Low EF.   





(2) Metabolic encephalopathy


Code(s): G93.41 - METABOLIC ENCEPHALOPATHY   Status: Resolved   





(3) Pneumonia


Code(s): J18.9 - PNEUMONIA, UNSPECIFIED ORGANISM   Status: Acute   Comment: 

COntinue with IV Abx, 6/7 days. COntiue Nebs. No fever.    





(4) Systolic and diastolic CHF, acute


Code(s): I50.41 - ACUTE COMBINED SYSTOLIC AND DIASTOLIC (CONGESTIVE) HRT FAIL   

Status: Acute   Comment: COntinue with IV lasix, to keep Volume status under 

control, to optimize for her Low EF. Plans for Life vest per cardiology.   





- Plan


cont current plan of care, plan discussed w/ family, continue antibiotics, PT/OT

, , respiratory therapy, incentive spirometry, out of bed/

ambulate, DVT proph w/lovenox





* .








Review of Systems





- Review of Systems


Eyes: negative: Pain, Vision Change, Conjunctivae Inflammation, Eyelid 

Inflammation, Redness, Other


ENT: negative: Ear Pain, Ear Discharge, Nose Pain, Nose Discharge, Nose 

Congestion, Mouth Pain, Mouth Swelling, Throat Pain, Throat Swelling, Other


Respiratory: Shortness of Breath.  negative: Cough, Dry, Hemoptysis, SOB with 

Excertion, Pleuritic Pain, Sputum, Wheezing


Cardiovascular: negative: chest pain, palpitations, orthopnea, paroxysmal 

nocturnal dyspnea, edema, light headedness, other


Gastrointestinal: negative: Nausea, Vomiting, Abdominal Pain, Diarrhea, 

Constipation, Melena, Hematochezia, Other


Genitourinary: negative: Dysuria, Frequency, Incontinence, Hematuria, Retention

, Other


Musculoskeletal: negative: Neck Pain, Shoulder Pain, Arm Pain, Back Pain, Hand 

Pain, Leg Pain, Foot Pain, Other


Skin: negative: Rash, Lesions, [place], Bruising, Other





- Medications/Allergies


Allergies/Adverse Reactions: 


 Allergies











Allergy/AdvReac Type Severity Reaction Status Date / Time


 


Iodine and Iodide Containing Allergy Severe SEVERE Verified 05/25/17 13:52





Produc   ITCHING  


 


shellfish derived Allergy Severe SEVERE Verified 05/25/17 13:52





   VOMITING  











Medications: 


 Current Medications





Acetaminophen (Tylenol)  650 mg PO Q4H PRN


   PRN Reason: Headache/Fever or Pain


   Last Admin: 07/12/18 21:11 Dose:  650 mg


Albuterol/Ipratropium (Duoneb)  3 ml NEB Q4H PRN


   PRN Reason:  SOB


Apixaban (Eliquis)  5 mg PO BID [facility]


   Last Admin: 07/13/18 08:24 Dose:  5 mg


Carvedilol (Coreg)  6.25 mg PO BID-[facility]


   Last Admin: 07/13/18 17:02 Dose:  6.25 mg


Diltiazem HCl (Cardizem Cd)  180 mg PO DAILY [facility]


   Last Admin: 07/13/18 08:24 Dose:  180 mg


Famotidine (Pepcid)  20 mg PO DAILY [facility]


   Last Admin: 07/13/18 08:24 Dose:  20 mg


Furosemide (Lasix)  40 mg PO DAILY-[facility]


   Stop: 07/15/18 23:59


   Last Admin: 07/13/18 08:24 Dose:  40 mg


Hydralazine HCl (Apresoline)  25 mg PO TID [facility]


   Last Admin: 07/13/18 16:00 Dose:  25 mg


Levofloxacin (Levaquin)  500 mg PO 0600 [facility]


   Last Admin: 07/13/18 06:17 Dose:  500 mg


Levothyroxine Sodium (Synthroid)  50 mcg PO [facility]


   Last Admin: 07/12/18 21:12 Dose:  50 mcg


Lisinopril (Zestril)  10 mg PO BID [facility]


   Last Admin: 07/13/18 08:23 Dose:  10 mg


Potassium Chloride (K-Dur)  20 meq PO QAM-[facility]


Pravastatin Sodium (Pravachol)  40 mg PO [facility]


   Last Admin: 07/12/18 21:11 Dose:  40 mg


Senna (Senokot)  2 tab PO HSPRN PRN


   PRN Reason: Constipation


Sodium Chloride (Flush - Normal Saline)  10 ml IVF Q12HR [facility]


   Last Admin: 07/13/18 08:25 Dose:  10 ml


Sodium Chloride (Flush - Normal Saline)  10 ml IVF PRN PRN


   PRN Reason: Saline Flush


   Last Admin: 07/10/18 21:27 Dose:  10 ml

## 2018-07-13 NOTE — OP
PROCEDURE NOTE:

 

Date:  07/12/18

 

PROCEDURE:

Electrical cardioversion. 

 

INDICATION FOR PROCEDURE:

This is a 78-year-old female with atrial fibrillation, somewhat atypical.  She was advised to undergo
 electrical cardioversion of atrial flutter.  

 

PROCEDURE DETAILS: 

She was taken to the recovery area where she underwent short-acting propofol. We did try 3 attempts, 
one a 50 joules, one at 100 joules, and one at 200 joules. After each time, she maintain sinus rhythm
 for less than a minute on each time and then she reverted back to atrial flutter. She tolerated the 
procedure well. There were no difficulties or complications encountered None.

 

ASSESSMENT AND PLAN:  

Atrial flutter with unsuccessful cardioversion attempts to sinus rhythm.  She maintains atrial flutte
r. Heart rate is in the 90s postprocedure. We will plan to start sotalol.

## 2018-07-14 NOTE — PDOC.PN
- Subjective


Encounter Start Date: 07/14/18


Encounter Start Time: 11:00


Patien is seen today, very drowsy and sleepy. No family memeber at bedside, pt 

is on IV duiruresis for chf/ Afib.





- Objective


Resuscitation Status: 


 











Resuscitation Status           DNR:Do Not Resuscitate














MAR Reviewed: Yes


Vital Signs & Weight: 


 Vital Signs (12 hours)











  Temp Pulse Pulse Pulse Resp BP BP


 


 07/14/18 14:35   70     98/50 L 


 


 07/14/18 11:15  97.7 F  70    18  


 


 07/14/18 10:31    86  74    107/55 L


 


 07/14/18 08:19   73     


 


 07/14/18 08:15  97.9 F  85    18  


 


 07/14/18 04:00  98.3 F  73    20  














  BP BP Pulse Ox


 


 07/14/18 14:35   


 


 07/14/18 11:15   98/55 L  94 L


 


 07/14/18 10:31  99/55 L  


 


 07/14/18 08:19   


 


 07/14/18 08:15   137/73  94 L


 


 07/14/18 04:00   122/63  97








 Weight











Admit Weight                   129 lb


 


Weight                         111 lb 12.8 oz














I&O: 


 











 07/13/18 07/14/18 07/15/18





 06:59 06:59 06:59


 


Intake Total 960 1360 


 


Output Total 1200 1600 


 


Balance -240 -240 











Result Diagrams: 


 07/13/18 05:23





 07/13/18 05:23


Radiology Reviewed by me: Yes





Phys Exam





- Physical Examination


HEENT: PERRLA, moist MMs


Neck: no nodes, no JVD


Respiratory: no wheezing, no rales


Cardiovascular: no significant murmur, irregular


Gastrointestinal: soft, non-tender


Musculoskeletal: no edema, pulses present


Neurological: non-focal, normal sensation


Lymphatic: no nodes





Dx/Plan


(1) Atrial fibrillation with RVR


Code(s): I48.91 - UNSPECIFIED ATRIAL FIBRILLATION   Status: Acute   Comment: PT 

on Po cardizem, Will cotninue with Cardiology recommedations, pt has Low EF.   





(2) Metabolic encephalopathy


Code(s): G93.41 - METABOLIC ENCEPHALOPATHY   Status: Resolved   Comment: 

Improved/   





(3) Pneumonia


Code(s): J18.9 - PNEUMONIA, UNSPECIFIED ORGANISM   Status: Acute   Comment: 

COntinue with IV Abx, 7/7 days. COntiue Nebs. No fever. d/C aBX .   





(4) Systolic and diastolic CHF, acute


Code(s): I50.41 - ACUTE COMBINED SYSTOLIC AND DIASTOLIC (CONGESTIVE) HRT FAIL   

Status: Acute   Comment: COntinue with IV lasix, to keep Volume status under 

control, to optimize for her Low EF. Plans for Life vest per cardiology.   





- Plan


cont current plan of care, hernandez catheter, PT/OT, , incentive 

spirometry, out of bed/ambulate, DVT proph w/lovenox





* .








Review of Systems





- Review of Systems


Eyes: negative: Pain, Vision Change, Conjunctivae Inflammation, Eyelid 

Inflammation, Redness, Other


Respiratory: Cough, Shortness of Breath


Cardiovascular: negative: chest pain, palpitations, orthopnea, paroxysmal 

nocturnal dyspnea, edema, light headedness, other


Gastrointestinal: negative: Nausea, Vomiting, Abdominal Pain, Diarrhea, 

Constipation, Melena, Hematochezia, Other


Genitourinary: negative: Dysuria, Frequency, Incontinence, Hematuria, Retention

, Other


Musculoskeletal: negative: Neck Pain, Shoulder Pain, Arm Pain, Back Pain, Hand 

Pain, Leg Pain, Foot Pain, Other





- Medications/Allergies


Allergies/Adverse Reactions: 


 Allergies











Allergy/AdvReac Type Severity Reaction Status Date / Time


 


Iodine and Iodide Containing Allergy Severe SEVERE Verified 05/25/17 13:52





Produc   ITCHING  


 


shellfish derived Allergy Severe SEVERE Verified 05/25/17 13:52





   VOMITING  











Medications: 


 Current Medications





Acetaminophen (Tylenol)  650 mg PO Q4H PRN


   PRN Reason: Headache/Fever or Pain


   Last Admin: 07/14/18 14:33 Dose:  650 mg


Albuterol/Ipratropium (Duoneb)  3 ml NEB Q4H PRN


   PRN Reason:  SOB


Apixaban (Eliquis)  5 mg PO BID [facility]


   Last Admin: 07/14/18 08:20 Dose:  5 mg


Carvedilol (Coreg)  6.25 mg PO BID-[facility]


   Last Admin: 07/14/18 08:19 Dose:  6.25 mg


Diltiazem HCl (Cardizem Cd)  180 mg PO DAILY [facility]


   Last Admin: 07/14/18 08:19 Dose:  180 mg


Famotidine (Pepcid)  20 mg PO DAILY [facility]


   Last Admin: 07/14/18 08:20 Dose:  20 mg


Furosemide (Lasix)  40 mg PO DAILY-[facility]


   Stop: 07/15/18 23:59


   Last Admin: 07/14/18 08:19 Dose:  40 mg


Hydralazine HCl (Apresoline)  25 mg PO TID [facility]


   Last Admin: 07/14/18 14:35 Dose:  Not Given


Levofloxacin (Levaquin)  500 mg PO 0600 [facility]


   Last Admin: 07/14/18 05:56 Dose:  500 mg


Levothyroxine Sodium (Synthroid)  50 mcg PO HS [facility]


   Last Admin: 07/13/18 20:19 Dose:  50 mcg


Lisinopril (Zestril)  10 mg PO BID [facility]


   Last Admin: 07/14/18 08:20 Dose:  10 mg


Potassium Chloride (K-Dur)  20 meq PO QAM-[facility]


   Last Admin: 07/14/18 08:19 Dose:  20 meq


Pravastatin Sodium (Pravachol)  40 mg PO [facility]


   Last Admin: 07/13/18 20:19 Dose:  40 mg


Senna (Senokot)  2 tab PO HSPRN PRN


   PRN Reason: Constipation


Sodium Chloride (Flush - Normal Saline)  10 ml IVF Q12HR [facility]


   Last Admin: 07/14/18 08:25 Dose:  10 ml


Sodium Chloride (Flush - Normal Saline)  10 ml IVF PRN PRN


   PRN Reason: Saline Flush


   Last Admin: 07/10/18 21:27 Dose:  10 ml

## 2018-07-14 NOTE — PDOC.CTH
<Kaley Moreno - Last Filed: 07/14/18 16:43>





Cardiology Progress Note





- Subjective





The pt seen and examined.  No overnight events.  No cardiac complaints.  She 

stated she have slightly more appetite today. 





- Objective


 Vital Signs











  Temp Pulse Pulse Pulse Resp BP BP


 


 07/14/18 11:15  97.7 F  70    18  


 


 07/14/18 10:31    86  74   107/55 L  99/55 L


 


 07/14/18 08:19   73     


 


 07/14/18 08:15  97.9 F  85    18  


 


 07/14/18 04:00  98.3 F  73    20  














  BP Pulse Ox


 


 07/14/18 11:15  98/55 L  94 L


 


 07/14/18 10:31  


 


 07/14/18 08:19  


 


 07/14/18 08:15  137/73  94 L


 


 07/14/18 04:00  122/63  97








 











Admit Weight                   129 lb


 


Weight                         111 lb 12.8 oz














 











 07/13/18 07/14/18 07/15/18





 06:59 06:59 06:59


 


Intake Total 960 1360 


 


Output Total 1200 1600 


 


Balance -240 -240 














- Physical Examination


General/Neuro: alert & oriented x3


Neck: no JVD present


Lungs: CTA


Heart: RRR


Abdomen: soft


Extremities: other: (no edema)





- Telemetry


Telemetry Rhythm: SR 





- Labs


Result Diagrams: 


 07/13/18 05:23





 07/13/18 05:23


 Troponin/CKMB











CK-MB (CK-2)  0.7 ng/mL (0-6.6)   07/07/18  23:31    


 


Troponin I  0.010 ng/mL (< 0.028)   07/07/18  23:31    














- Assessment/Plan





1. Paroxysmal Aflutter/Afib - S/p Cardioversion on 07/12/18, which she returned 

back to Afib/Aflutter same day.  She converted back to SR around 1000 on 07/14/ 18 with Coreg and Diltiazem.  On Eliquis 5 mg BID. Cont. to monitor on tele.


2. New onset of Systolic HF - Echo on 07/09/18 showed EF 25-30% (which EF 50-55

% in 02/2018); On Coreg 3.125 mg BID and Lisinopril 10mg BID; cont. to monitor


3. Low grade temp - No evidence of PNA, UA is negative.   


4. HTN - stable with current med; cont monitor


5. Hyperlipidemia - on Statin


6. Hypothyroidism - On Levothyroxin


7. CKD - stable 


MAR reviewed





* Eho on 07/09/18 showed EF 25-30%, mild dilated LA, mildly LVH, mild TR.


* From Cardiac standpoint, the pt is stable to tx to Rehab.


* Plan to switch to Sotolol 5-7 days after stopping antibiotics.





<addendum>


* Coreg 6.25mg @ 1700 dose will be on hold due to SBP <100.  Decrease Coreg to 

3.125mg BID





Review of Systems





- Review of Systems


Constitutional: reports: weakness


EENTM: reports: no symptoms reported


Respiratory: reports: no symptoms reported


Cardiac (ROS): reports: no symptoms reported


ABD/GI: reports: no symptoms reported





<YOLA Scott - Last Filed: 07/15/18 21:47>





Cardiology Progress Note





- Objective


 Vital Signs











  Temp Pulse Resp BP BP Pulse Ox


 


 07/15/18 20:35   64    


 


 07/15/18 20:33   64   118/57 L  


 


 07/15/18 17:00      134/61 


 


 07/15/18 15:31   64    


 


 07/15/18 15:30  97.3 F L  66  18   123/58 L  97


 


 07/15/18 11:40  97.5 F L  64  18   114/58 L  97








 











Admit Weight                   129 lb


 


Weight                         112 lb














 











 07/14/18 07/15/18 07/16/18





 06:59 06:59 06:59


 


Intake Total 1360 900 720


 


Output Total 1600 1500 1300


 


Balance -240 600 580














- Labs


Result Diagrams: 


 07/15/18 05:05





 07/15/18 05:05


 Troponin/CKMB











CK-MB (CK-2)  0.7 ng/mL (0-6.6)   07/07/18  23:31    


 


Troponin I  0.010 ng/mL (< 0.028)   07/07/18  23:31    














- Assessment/Plan





Pt. was seen and eval. by me. I agree with the A/P by the RORO Moreno.

## 2018-07-15 NOTE — PDOC.CTH
<Kaley Moreno - Last Filed: 07/15/18 14:42>





Cardiology Progress Note





- Subjective





The pt seen and examined.  No overnight events.  She complains of headache.  





- Objective


 Vital Signs











  Temp Pulse Resp BP Pulse Ox


 


 07/15/18 11:40  97.5 F L  64  18  114/58 L  97


 


 07/15/18 08:30   66   


 


 07/15/18 08:29   66   


 


 07/15/18 08:25  97.7 F  63  18  118/58 L  95


 


 07/15/18 04:00  97.8 F  66  14  110/57 L  92 L








 











Admit Weight                   129 lb


 


Weight                         112 lb














 











 07/14/18 07/15/18 07/16/18





 06:59 06:59 06:59


 


Intake Total 1360 900 


 


Output Total 1600 1500 


 


Balance -240 -600 














- Physical Examination


General/Neuro: alert & oriented x3


Neck: no JVD present


Lungs: CTA


Heart: RRR


Abdomen: soft


Extremities: other: (No edema)





- Telemetry


Telemetry Rhythm: SR, PACs, HR 68





- Labs


Result Diagrams: 


 07/15/18 05:05





 07/15/18 05:05


 Troponin/CKMB











CK-MB (CK-2)  0.7 ng/mL (0-6.6)   07/07/18  23:31    


 


Troponin I  0.010 ng/mL (< 0.028)   07/07/18  23:31    














- Assessment/Plan





1. Paroxysmal Aflutter/Afib - S/p Cardioversion on 07/12/18, which she returned 

back to Afib/Aflutter same day.  She converted back to SR around 1000 on 07/14/ 18 with Coreg and Diltiazem.  On Eliquis 5 mg BID. Cont. to monitor on tele.


2. New onset of Systolic HF - Echo on 07/09/18 showed EF 25-30% (which EF 50-55

% in 02/2018); On Coreg 3.125 mg BID and Lisinopril 10mg BID; cont. to monitor


3. Low grade temp - No evidence of PNA, UA is negative.   


4. HTN - stable with current med; cont monitor


5. Hyperlipidemia - on Statin


6. Hypothyroidism - On Levothyroxin


7. CKD - stable 


MAR reviewed





* Eho on 07/09/18 showed EF 25-30%, mild dilated LA, mildly LVH, mild TR.


* From Cardiac standpoint, the pt is stable to tx to Rehab.


* Plan to switch to Sotolol 5-7 days after stopping antibiotics.





<addendum>


* Coreg 6.25mg @ 1700 dose will be on hold due to SBP <100.  Decrease Coreg to 

3.125mg BID


* No lifeVest due to code status-DNR








Review of Systems





- Review of Systems


Constitutional: reports: no symptoms reported


EENTM: reports: no symptoms reported


Respiratory: reports: no symptoms reported


Cardiac (ROS): reports: no symptoms reported


ABD/GI: reports: no symptoms reported


: reports: no symptoms reported


Musculoskeletal: reports: no symptoms reported





<YOLA Scott - Last Filed: 07/15/18 21:48>





Cardiology Progress Note





- Objective


 Vital Signs











  Temp Pulse Resp BP BP Pulse Ox


 


 07/15/18 20:35   64    


 


 07/15/18 20:33   64   118/57 L  


 


 07/15/18 17:00      134/61 


 


 07/15/18 15:31   64    


 


 07/15/18 15:30  97.3 F L  66  18   123/58 L  97


 


 07/15/18 11:40  97.5 F L  64  18   114/58 L  97








 











Admit Weight                   129 lb


 


Weight                         112 lb














 











 07/14/18 07/15/18 07/16/18





 06:59 06:59 06:59


 


Intake Total 1360 900 720


 


Output Total 1600 1500 1300


 


Balance -240 -600 -580














- Labs


Result Diagrams: 


 07/15/18 05:05





 07/15/18 05:05


 Troponin/CKMB











CK-MB (CK-2)  0.7 ng/mL (0-6.6)   07/07/18  23:31    


 


Troponin I  0.010 ng/mL (< 0.028)   07/07/18  23:31    














- Assessment/Plan





Pt. was seen and eval. by me. I agree with the A/P by the NP Tiffanie.

## 2018-07-15 NOTE — PDOC.PN
- Subjective


Encounter Start Date: 07/15/18


Encounter Start Time: 16:25


Subjective: f/u for systolic CHF and PNA on current Lasix and completing 7 days


-: of Levaquin. Cardiology recommending rate control measures for A-fib


-: and OAC for d/c.





- Objective


Resuscitation Status: 


 











Resuscitation Status           DNR:Do Not Resuscitate














MAR Reviewed: Yes


Vital Signs & Weight: 


 Vital Signs (12 hours)











  Temp Pulse Resp BP Pulse Ox


 


 07/15/18 15:31   64   


 


 07/15/18 11:40  97.5 F L  64  18  114/58 L  97


 


 07/15/18 08:30   66   


 


 07/15/18 08:29   66   


 


 07/15/18 08:25  97.7 F  63  18  118/58 L  95








 Weight











Admit Weight                   129 lb


 


Weight                         112 lb














I&O: 


 











 07/14/18 07/15/18 07/16/18





 06:59 06:59 06:59


 


Intake Total 1360 900 


 


Output Total 1600 1500 


 


Balance -240 -600 











Result Diagrams: 


 07/15/18 05:05





 07/15/18 05:05


EKG Reviewed by me: Yes (Tele - SR converted on 7/14)





Phys Exam





- Physical Examination


Constitutional: NAD


HEENT: PERRLA, sclera anicteric, oral pharynx no lesions


Neck: no nodes, no JVD, supple, full ROM


diminished in bases


Respiratory: no wheezing


Cardiovascular: RRR, no significant murmur, no rub, gallop


Gastrointestinal: soft, non-tender, no distention, positive bowel sounds


Musculoskeletal: no edema, pulses present


Neurological: non-focal, normal sensation, moves all 4 limbs


Skin: no rash, normal turgor, cap refill <2 seconds





Dx/Plan


(1) Systolic heart failure


Code(s): I50.20 - UNSPECIFIED SYSTOLIC (CONGESTIVE) HEART FAILURE   Status: 

Acute   


Qualifiers: 


   Heart failure chronicity: acute   Qualified Code(s): I50.21 - Acute systolic 

(congestive) heart failure   


Comment: EF 25-30%, continue Lasix 40mg daily, Coreg, ACE-i, no LifeVest or 

AICD per Cardiology   





(2) Atrial fibrillation with RVR


Code(s): I48.91 - UNSPECIFIED ATRIAL FIBRILLATION   Status: Acute   Comment: 

Converted to SR on 7/14, continue Cardizem and Coreg, Eliquis   





(3) Pneumonia


Code(s): J18.9 - PNEUMONIA, UNSPECIFIED ORGANISM   Status: Acute   Comment: 

Completed course of abx with Levaquin   





(4) Metabolic encephalopathy


Code(s): G93.41 - METABOLIC ENCEPHALOPATHY   Status: Acute   Comment: 

Multifactorial, improving   





(5) Hypothyroid


Code(s): E03.9 - HYPOTHYROIDISM, UNSPECIFIED   Status: Chronic   Comment: 

Continue Levothyroxine 50mcg daily   





- Plan


PT/OT, , DVT proph w/SCDs


Stable overall


-: Continue Coreg and Cardizem for rate control


-: Eliquis for OAC


-: PT for mobilization/ambulation


-: AM lab: H/H, Creatinine





* .

## 2018-07-16 NOTE — PDOC.CTH
Cardiology Progress Note





- Subjective





The pt seen and examined.  No overnight events.  No cardiac complaints.  No 

longer having headache. 





- Objective


 Vital Signs











  Temp Pulse Pulse Resp BP BP BP


 


 07/16/18 15:53  98.3 F  90   18   


 


 07/16/18 15:51   60    115/64  


 


 07/16/18 13:10    60    112/53 L 


 


 07/16/18 11:36  97.4 F L  60   14   


 


 07/16/18 08:25   67     


 


 07/16/18 08:19  97.9 F  67   16    117/55 L














  BP Pulse Ox Pulse Ox


 


 07/16/18 15:53  115/64  96 


 


 07/16/18 15:51   


 


 07/16/18 13:10    93 L


 


 07/16/18 11:36  102/55 L  92 L 


 


 07/16/18 08:25   


 


 07/16/18 08:19   93 L 








 











Admit Weight                   129 lb


 


Weight                         112 lb 6.4 oz














 











 07/15/18 07/16/18 07/17/18





 06:59 06:59 06:59


 


Intake Total 900 1120 


 


Output Total 1500 1600 


 


Balance -600 -480 














- Physical Examination


General/Neuro: alert & oriented x3


Neck: no JVD present


Lungs: CTA


Heart: RRR


Abdomen: soft


Extremities: other: (SR)





- Telemetry


Telemetry Rhythm: No edema





- Labs


Result Diagrams: 


 07/15/18 05:05





 07/15/18 05:05


 Troponin/CKMB











CK-MB (CK-2)  0.7 ng/mL (0-6.6)   07/07/18  23:31    


 


Troponin I  0.010 ng/mL (< 0.028)   07/07/18  23:31    














- Assessment/Plan





1. Paroxysmal Aflutter/Afib - S/p Cardioversion on 07/12/18, which she returned 

back to Afib/Aflutter same day.  She converted back to SR around 1000 on 07/14/ 18 with Coreg and Diltiazem.  On Eliquis 5 mg BID. Cont. to monitor on tele.


2. New onset of Systolic HF - Echo on 07/09/18 showed EF 25-30% (which EF 50-55

% in 02/2018); On Coreg 3.125 mg BID and Lisinopril 10mg BID; cont. to monitor


3. Low grade temp - No evidence of PNA, UA is negative.   


4. HTN - stable with current med; cont monitor


5. Hyperlipidemia - on Statin


6. Hypothyroidism - On Levothyroxin


7. CKD - stable 


MAR reviewed





* Eho on 07/09/18 showed EF 25-30%, mild dilated LA, mildly LVH, mild TR.


* From Cardiac standpoint, the pt is stable to tx to Rehab.


* Plan to switch to Sotolol 5-7 days after stopping antibiotics.


* No lifeVest due to code status-DNR





Review of Systems





- Review of Systems


Constitutional: reports: no symptoms reported


EENTM: reports: no symptoms reported


Respiratory: reports: no symptoms reported


Cardiac (ROS): reports: no symptoms reported


ABD/GI: reports: no symptoms reported


: reports: no symptoms reported

## 2018-07-16 NOTE — PDOC.PN
- Subjective


Encounter Start Date: 07/16/18


Encounter Start Time: 18:30


Subjective: f/u for CHF/PNA. Feels better overall. Ambulated 168ft. Appetite


-: improved.





- Objective


Resuscitation Status: 


 











Resuscitation Status           DNR:Do Not Resuscitate














MAR Reviewed: Yes


Vital Signs & Weight: 


 Vital Signs (12 hours)











  Temp Pulse Pulse Resp BP BP BP


 


 07/16/18 15:53  98.3 F  90   18   


 


 07/16/18 15:51   60    115/64  


 


 07/16/18 13:10    60    112/53 L 


 


 07/16/18 11:36  97.4 F L  60   14   


 


 07/16/18 08:25   67     


 


 07/16/18 08:19  97.9 F  67   16    117/55 L














  BP Pulse Ox Pulse Ox


 


 07/16/18 15:53  115/64  96 


 


 07/16/18 15:51   


 


 07/16/18 13:10    93 L


 


 07/16/18 11:36  102/55 L  92 L 


 


 07/16/18 08:25   


 


 07/16/18 08:19   93 L 








 Weight











Admit Weight                   129 lb


 


Weight                         112 lb 6.4 oz














I&O: 


 











 07/15/18 07/16/18 07/17/18





 06:59 06:59 06:59


 


Intake Total 900 1120 


 


Output Total 1500 1600 


 


Balance -600 -480 











Result Diagrams: 


 07/15/18 05:05





 07/15/18 05:05


EKG Reviewed by me: Yes (Tele - SR)





Phys Exam





- Physical Examination


Constitutional: NAD


HEENT: PERRLA, sclera anicteric, oral pharynx no lesions


Neck: no nodes, no JVD, supple, full ROM


few coarse sounds


Cardiovascular: RRR, no significant murmur, no rub, gallop


Gastrointestinal: soft, non-tender, no distention, positive bowel sounds


Musculoskeletal: no edema, pulses present


Neurological: normal sensation, moves all 4 limbs


Psychiatric: normal affect, A&O x 3


Skin: no rash, normal turgor, cap refill <2 seconds





Dx/Plan


(1) Systolic heart failure


Code(s): I50.20 - UNSPECIFIED SYSTOLIC (CONGESTIVE) HEART FAILURE   Status: 

Acute   


Qualifiers: 


   Heart failure chronicity: acute   Qualified Code(s): I50.21 - Acute systolic 

(congestive) heart failure   


Comment: EF 25-30%, continue Lasix 40mg daily, Coreg, ACE-i, no LifeVest or 

AICD per Cardiology   





(2) Atrial fibrillation with RVR


Code(s): I48.91 - UNSPECIFIED ATRIAL FIBRILLATION   Status: Acute   Comment: 

Converted to SR on 7/14, continue Cardizem and Coreg, Eliquis   





(3) Pneumonia


Code(s): J18.9 - PNEUMONIA, UNSPECIFIED ORGANISM   Status: Acute   Comment: 

Completed course of abx with Levaquin   





(4) Metabolic encephalopathy


Code(s): G93.41 - METABOLIC ENCEPHALOPATHY   Status: Acute   Comment: 

Multifactorial, improving   





(5) Hypothyroid


Code(s): E03.9 - HYPOTHYROIDISM, UNSPECIFIED   Status: Chronic   Comment: 

Continue Levothyroxine 50mcg daily   





- Plan


PT/OT, , out of bed/ambulate, DVT proph w/SCDs


Stable overall


-: Continue Coreg/Cardizem


-: Continue Eliquis


-: OOB/ambulate


-: Plan for d/c to swing bed 7/17





* .

## 2018-07-17 NOTE — DIS
DATE OF ADMISSION:  07/07/2018

 

DATE OF DISCHARGE:  07/17/2018

 

DISCHARGE DIAGNOSES:

1.  Acute on chronic systolic congestive heart failure with ejection fraction of 25%-30%.

2.  Atrial fibrillation with variable rate control.

3.  Atypical atrial flutter with current sinus mechanism.

4.  Chronic obstructive pulmonary disease.

5.  Pneumonia, suspected community acquired, resolved.

6.  Acute metabolic encephalopathy, multifactorial, resolved.

7.  Hypothyroidism, stable.

8.  Deconditioning.

 

CONSULTATIONS:  Dr. Katz with Electrophysiology Service.  Dr. Scott and Dr. Quinones with Cardiology [facility].

 

PERTINENT LABORATORY AND X-RAY FINDINGS:  Potassium ranged between 3.0-3.6.  Troponin I negative x2. 
 CRP 2.69, lactic acid level 2.0, TSH 0.41.  Procalcitonin 0.11.  CBC showed a white blood cell count
 ranging between 6.0-6.9.  ZO screen negative.  Blood cultures x2 from 07/07/2018 showed no growth a
t 5 days.  Urine culture dated 07/07/2018 showed less than 5000 colonies of yeast species.  CT angiog
mallory of the chest dated 07/07/2018 showed no evidence for pulmonary embolus.  Emphysematous changes no
[place].  A 2D transthoracic echocardiogram dated 07/08/2018 showed technically adequate exam.  Repeat 2D
 transthoracic echocardiogram 07/09/2018 showed ejection fraction of 25%-30%.  Mild tricuspid regurgi
tation noted.

 

HOSPITAL COURSE:  The patient was initially admitted to the telemetry unit after presenting with alte
red mentation and metabolic encephalopathy of unclear etiology.  The patient was initially treated wi
th broad spectrum IV antibiotic therapy after concern for underlying infectious process, potentially 
pulmonary source.  Chest imaging including CT angiogram of the chest showed emphysematous changes wit
hout focal infiltrate.  The patient was treated for suspected underlying pneumonia with a course of L
evaquin x7 days.  The patient also received general pulmonary supportive measures including bronchodi
lator therapy with DuoNeb and oxygen supplementation ranging between 2-4 liters per minute by nasal c
annula.  The patient was also evaluated for atrial fibrillation with rapid ventricular response as we
ll as atypical atrial flutter.  The patient underwent evaluation including unsuccessful electrical ca
rdioversion attempt on 07/12/2018.  The patient was evaluated by the Electrophysiology service with r
ecommendations for rate control measures including Coreg and Cardizem.  The patient was also continue
d on Eliquis 5 mg b.i.d. after a CHADS2-VASc score of 5.  The patient continued to clinically improve
, however, was noted to be deconditioned and evaluated by physical therapy service.  The patient ambu
lated with standby assistance with the use of a rolling walker and had achieved up to 170 feet by the
 time of discharge.  Due to the patient's overall comorbid status and clinical condition, the patient
 was deemed an appropriate candidate for ongoing care through transfer to [facility].  Th
e patient has been approved and will transfer on 07/17/2018.  I have examined the patient at the time
 of discharge and discussed followup instructions and disposition planning.  The patient verbalizes u
nderstanding and agreement and ready for discharge 07/17/2018.

 

DISCHARGE MEDICATIONS:

1.  Eliquis 5 mg p.o. b.i.d.

2.  Enteric-coated aspirin 81 mg p.o. daily.

3.  Coreg 3.125 mg p.o. b.i.d.

4.  Zyrtec 10 mg 1 tab p.o. daily.

5.  Vitamin D3 1000 units p.o. daily.

6.  Cardizem- mg p.o. daily.

7.  Lasix 20 mg 1 tab p.o. daily.

8.  Hydralazine 25 mg p.o. t.i.d.

9.  DuoNeb 3 mL nebulized q.4 hours p.r.n.

10.  Levothyroxine 50 mcg p.o. at bedtime.

11.  Zestril 5 mg p.o. b.i.d.

12.  Mobic 7.5 mg p.o. daily.

13.  Omega 3 fatty acids 1 capsule p.o. daily.

14.  K-Dur 20 mEq p.o. q.a.m.

15.  Pravachol 40 mg p.o. at bedtime.

16.  Ranitidine 150 mg p.o. daily.

 

FOLLOWUP:

The patient to follow up with her primary care provider, Dr. Alexa Martin, within 7 days of dischar
.  The patient will follow up with cardiac rehabilitation services in [place].  The patient wi
ll follow up with Dr. Sascha Scott and to call her office for appointment time and date.

 

CONDITION ON DISCHARGE:  Stable.

 

ACTIVITY:  [place] with use of a rolling walker.  General fall risk precautions.

 

DIET:  Heart healthy.

 

CODE STATUS:  Do not resuscitate.

 

DISPOSITION:  Transfer to [facility], 07/17/2018.

 

Total time preparing and coordinating discharge is 35 minutes.

## 2018-07-17 NOTE — PDOC.CTH
Cardiology Progress Note





- Objective


 Vital Signs











  Temp Pulse Pulse Resp BP BP BP


 


 07/17/18 11:38  97.6 F  76   16    111/57 L


 


 07/17/18 10:56    67   103/52 L  


 


 07/17/18 07:57  97.5 F L  65   16   118/53 L 


 


 07/17/18 04:00  97.5 F L  61   16   114/62 














  Pulse Ox Pulse Ox Pulse Ox


 


 07/17/18 11:38  97  


 


 07/17/18 10:56   92 L  96


 


 07/17/18 07:57  96  


 


 07/17/18 04:00  99  








 











Admit Weight                   129 lb


 


Weight                         111 lb 12.8 oz














 











 07/16/18 07/17/18 07/18/18





 06:59 06:59 06:59


 


Intake Total 1120 1057 


 


Output Total 1600 701 


 


Balance -480 356 














- Labs


Result Diagrams: 


 07/17/18 05:44





 07/17/18 05:44


 Troponin/CKMB











CK-MB (CK-2)  0.7 ng/mL (0-6.6)   07/07/18  23:31    


 


Troponin I  0.010 ng/mL (< 0.028)   07/07/18  23:31    














- Assessment/Plan





1. Paroxysmal Aflutter/Afib - S/p Cardioversion on 07/12/18, which she returned 

back to Afib/Aflutter same day.  She converted back to SR around 1000 on 07/14/ 18 with Coreg and Diltiazem.  On Eliquis 5 mg BID. Cont. to monitor on tele.


2. New onset of Systolic HF - Echo on 07/09/18 showed EF 25-30% (which EF 50-55

% in 02/2018); On Coreg 3.125 mg BID and Lisinopril 10mg BID; cont. to monitor


3. Low grade temp - No evidence of PNA, UA is negative.   


4. HTN - stable with current med; cont monitor


5. Hyperlipidemia - on Statin


6. Hypothyroidism - On Levothyroxin


7. CKD - stable 


MAR reviewed





* Eho on 07/09/18 showed EF 25-30%, mild dilated LA, mildly LVH, mild TR.


* From Cardiac standpoint, the pt is stable to tx to Rehab.


* Plan to switch to Sotolol 5-7 days after stopping antibiotics.


* No lifeVest due to code status-DNR

## 2018-07-17 NOTE — PDOC.CTH
Cardiology Progress Note





- Subjective





The pt seen and examined.  No overnight events.  No cardiac complaints.  She 

stated she felt better today. 





- Objective


 Vital Signs











  Temp Pulse Pulse Resp BP BP BP


 


 07/17/18 11:38  97.6 F  76   16    111/57 L


 


 07/17/18 10:56    67   103/52 L  


 


 07/17/18 07:57  97.5 F L  65   16   118/53 L 


 


 07/17/18 04:00  97.5 F L  61   16   114/62 














  Pulse Ox Pulse Ox Pulse Ox


 


 07/17/18 11:38  97  


 


 07/17/18 10:56   92 L  96


 


 07/17/18 07:57  96  


 


 07/17/18 04:00  99  








 











Admit Weight                   129 lb


 


Weight                         111 lb 12.8 oz














 











 07/16/18 07/17/18 07/18/18





 06:59 06:59 06:59


 


Intake Total 1120 1057 


 


Output Total 1600 701 


 


Balance -480 356 














- Physical Examination


General/Neuro: alert & oriented x3


Neck: no JVD present


Lungs: CTA


Heart: RRR


Abdomen: soft


Extremities: other: (No edema)





- Telemetry


Telemetry Rhythm: SR 





- Labs


Result Diagrams: 


 07/17/18 05:44





 07/17/18 05:44


 Troponin/CKMB











CK-MB (CK-2)  0.7 ng/mL (0-6.6)   07/07/18  23:31    


 


Troponin I  0.010 ng/mL (< 0.028)   07/07/18  23:31    














- Assessment/Plan





1. Paroxysmal Aflutter/Afib - S/p Cardioversion on 07/12/18, which she returned 

back to Afib/Aflutter same day.  She converted back to SR around 1000 on 07/14/ 18 with Coreg and Diltiazem.  On Eliquis 5 mg BID. Cont. to monitor on tele.


2. New onset of Systolic HF - Echo on 07/09/18 showed EF 25-30% (which EF 50-55

% in 02/2018); On Coreg 3.125 mg BID and Lisinopril 10mg BID; cont. to monitor


3. Low grade temp - No evidence of PNA, UA is negative.   


4. HTN - stable with current med; cont monitor


5. Hyperlipidemia - on Statin


6. Hypothyroidism - On Levothyroxin


7. CKD - stable 


MAR reviewed





* Eho on 07/09/18 showed EF 25-30%, mild dilated LA, mildly LVH, mild TR.


* From Cardiac standpoint, the pt is stable to tx to Rehab.


* Plan to switch to Sotolol 5-7 days after stopping antibiotics.


* No lifeVest due to code status-DNR











Review of Systems





- Review of Systems


Constitutional: reports: weakness


EENTM: reports: no symptoms reported


Respiratory: reports: no symptoms reported


Cardiac (ROS): reports: no symptoms reported


ABD/GI: reports: poor appetite


: reports: no symptoms reported


Musculoskeletal: reports: no symptoms reported

## 2018-07-24 NOTE — DIS
DATE OF ADMISSION:  07/17/2018

 

DATE OF DISCHARGE:  07/23/2018

 

ATTENDING:  Dr. Huffman.

 

DISCHARGE PHYSICIAN:  Dr. Pelayo.

 

PRIMARY CARE PHYSICIAN:  Dr. Martin.

 

REASON FOR ADMISSION:  Transferred from [facility] for skilled rehabilitation.

 

DIAGNOSES:

1.  Physical deconditioning/general weakness, improved.

2.  Community-acquired pneumonia, treated.

3.  New onset congestive heart failure with systolic dysfunction and ejection fraction of 25-30%.

4.  Chronic atrial fibrillation with recent rapid ventricular response, status post unsuccessful elec
tric cardioversion.  Now rate controlled with Cardizem and Coreg.

5.  Hypertension.

6.  Suspected chronic obstructive pulmonary disease, stable.

7.  Chronic kidney disease.

8.  Dyslipidemia.

9.  Hypothyroidism.

10.  Constipation.

11.  Degenerative joint disease.

 

DISPOSITION:  Home with a daughter.

 

CONDITION ON DISCHARGE:  Stable.

 

HOME MEDICATIONS:

1.  Coreg 3.125 mg p.o. b.i.d.

2.  Cardizem 180 mg p.o. daily.

3.  Potassium chloride 20 mEq p.o. q.a.m.

4.  Eliquis 5 mg p.o. b.i.d.

5.  Acetaminophen 325 mg 2 tablets q.4 p.r.n.

6.  Loratadine 10 mg p.o. daily.

7.  Vitamin D3 1000 units p.o. daily.

8.  Famotidine 20 mg p.o. daily.

9.  Furosemide 20 mg p.o. MWF.

10.  DuoNeb 3 mL q.4 hours p.r.n.

11.  Levothyroxine 50 mcg p.o. daily.

12.  Meloxicam 7.5 mg q.2 days.

13.  Fish oil 1 tablet p.o. daily.

14.  Potassium chloride 20 mEq p.o. daily.

15.  Pravastatin 20 mg p.o. daily.

16.  Aspirin 81 mg p.o. daily.

17.  Ranitidine 150 mg p.o. daily.

 

DISCONTINUE MEDICATIONS:  Medications discontinued during this hospitalization were the following:  H
ydralazine 25 mg p.o. t.i.d. and lisinopril.

 

DISCHARGE INSTRUCTIONS:

Diet:  AHA, low salt.

Activities:  Ad ne[place].  To use rolling walker.

Followup:

1.  Follow up with PCP, Dr. Martin in 1-2 weeks, sooner with concerns.

2.  Follow up with cardiologist, Dr. Scott in 3-4 weeks.

3.  Home health arranged for patient prior to discharge for skilled nursing, PT, OT evaluate and valeriy mares.

 

HISTORY OF PRESENT ILLNESS AND HOSPITAL COURSE:  Ms. Cuevas is a 78-year-old  female who was 
transferred from [facility] to [facility] on 07/17/2018 after she 
underwent treatment of community acquired pneumonia, acute metabolic encephalopathy, and new onset co
ngestive heart failure exacerbation.  At the time of admission, the patient was started on DuoNeb for
 suspected underlying COPD and emphysema with exacerbation.  She has had an elevated BNP upon admissi
on.  She does follow with cardiologist, Dr. Scott on an outpatient basis and previously no history of 
congestive heart failure.  While she was in the hospital, echocardiogram showed significant systolic 
dysfunction with ejection fraction of 25-30%.  She is being treated for pneumonia, she was noted to h
ave a poor rate control with chronic atrial fibrillation.  She was found to have rapid ventricular re
sponse.  Cardiology was consulted and performed electrical cardioversion on 07/12/2018.  However, thi
s was unsuccessful.  The patient converted to sinus rhythm with addition of Coreg and diltiazem posto
peratively.  Her mental status had improved significantly with antibiotic therapy and fluid hydration
.  She was deemed generally weak and deconditioned deemed to benefit for further rehabilitation in [facility], prior to returning to the home environment.  The patient had an unremarkable re
hab course.  She was walking 350 feet using a rolling walker with contact guard assist prior to disch
arge.  The patient had made progress with rehabilitation, but has not yet met any conditions of a goa
l, she displays improvement with strength, endurance, and gait overall.  Does recommended to continue
 therapy at home with home health.  Patient agrees.  On 07/23/2018, she felt that she is strong enoug
h and comfortable to go back home.  She called her daughter and daughter came in to pick her up from 
the hospital.  Daughter was agreeable to patient's plan thus discharge.  At the time of discharge, radha clemons denies dyspnea at rest or exertion, pain with breathing, chest pain, fever, and volume retentio
n.  She was deemed hemodynamically stable to go back home per request.  In the absence of Dr. Huffman
, the attending physician, I discharged the patient per request.  Vital signs prior to discharge, blo
od pressure 138/64, temperature 98.3, pulse 79, respirations 20, O2 sats 94-95% on room air, weight 1
11 pounds and 4 ounces, height 5 feet 2 inches.

 

CODE STATUS:  DO NOT RESUSCITATE.

 

Time spent on this discharge 32 minutes in examining the patient, reviewing the notes, and coordinati
ng care.

## 2019-02-04 NOTE — HP
DATE OF ADMISSION:  07/17/2018

 

PRIMARY CARE PHYSICIAN:  Alexa Martin MD

 

CHIEF COMPLAINT:  Transfer from [facility] for planned 
physical therapy and occupational therapy for diagnosis of generalized weakness 
and deconditioning.

 

HISTORY OF PRESENT ILLNESS:  This is a 78-year-old pleasant  female 
that is seen today for admission for physical therapy and occupational therapy, 
status post hospitalization at [facility] where she underwent 
treatment for community-acquired pneumonia, acute metabolic encephalopathy as 
well as new diagnosis of congestive heart failure with acute exacerbation.  
History is obtained primarily from the patient as well as the medical record.  
The patient presented to [facility] Emergency Room on 07/07/2018 with 
altered mental status and confusion.  The patient had apparently been having 
issues with worsening cold-like symptoms prior to her ER visit.  She did see 
her primary care physician on 07/05/2018, where she was treated with Augmentin 
for diagnosis of bronchitis.  She subsequently worsened and then presented to 
the emergency room where she was found to have a fever of 102 and elevated 
lactic acid of 2.6 and a chest x-ray showing emphysematous changes, but no 
focal infiltrate.  She was transferred to [facility] where 
she was started on broad spectrum antibiotics for suspected community-acquired 
pneumonia as well as DuoNebs for suspected underlying COPD and emphysema with 
exacerbation.  In the emergency room, she was noted to have an elevated BNP 
level, which was new for her.  She does follow with a cardiologist, Dr. Scott on 
an outpatient basis and had previously no history of congestive heart failure.  
While she was hospitalized, she underwent echocardiogram that did show 
significant systolic dysfunction with ejection fraction of 25-30%.  While 
patient was being treated for her pneumonia, she was noted to have poor rate 
control with her chronic atrial fibrillation.  She was found to be in rapid 
ventricular response.  Cardiology was consulted and performed electrical 
cardioversion on 07/12/2018.  This was unfortunately unsuccessful, but patient 
did go on to convert to sinus rhythm with addition of Coreg and diltiazem 
postoperatively.  The acute encephalopathy noted at admission, resolved quickly 
with antibiotics and fluids and medical stabilization.  Physical therapy and 
occupational therapy consult was performed and patient was noted to have 
generalized weakness with deconditioning.  Therefore, decision was made for the 
patient to be transferred to [facility] swing bed for planned physical 
therapy and occupational therapy until she can return home to independent 
living.  At present, she uses a walker and has been ambulating between 100 and 
150 feet.  Upon examination, the patient's only complaint is feeling weak as 
well as a headache.  She states she has had a headache since she was in the 
hospital that has been relieved with Tylenol as needed.  Otherwise, she denies 
any chest pain, shortness of breath, joint pain, nausea, vomiting, abdominal 
pain, constipation or diarrhea.  She does endorse some recent weight loss.  She 
was previously 140 pounds and was noted by her primary care physician to be 
having issues with further weight loss of approximately 25-30 pounds.  She 
states the main reason she thinks she lost weight was because of issues with 
her teeth.  She states she has had a good appetite and has been eating well 
since she was in the hospital.  No family is at bedside at this time.

 

PAST MEDICAL HISTORY:

1.  New diagnosis of congestive heart failure with systolic dysfunction, 
ejection fraction of 25-30%.

2.  Chronic atrial fibrillation, status post on 07/12/2018, on chronic 
anticoagulation and beta-blocker therapy.

3.  Suspected chronic obstructive pulmonary disease based off multiple images 
showing emphysematous changes.

4.  Hypothyroidism.

5.  Hypertension.

6.  Hyperlipidemia.

7.  History of brain aneurysm with clipping in 1990.

 

PAST SURGICAL HISTORY:

1.  Brain aneurysm repair.

2.  Hysterectomy.

 

SOCIAL HISTORY:  Patient does have a history of heavy smoking in the past; 
however, quit about 12 years ago.  She denies any illicit drug use or alcohol 
use.  She is currently DNR per her wishes and has an out of hospital DNR 
signed.  She designates her power of  as Neena Lundberg which is a family 
friend.  The patient lives independently.

 

ALLERGIES:  Patient is allergic to IODINE, and IODINE CONTAINING PRODUCTS, as 
well as SHELLFISH.

 

REVIEW OF SYSTEMS:  All negative except for the ones mentioned above in HPI.  
All 10 systems were reviewed.

 

CURRENT MEDICATIONS:

1.  Acetaminophen 650 mg every 4 hours as needed.

2.  DuoNeb 3 mL every 4 hours as needed for shortness of breath or wheezing.

3.  Eliquis 5 mg twice daily scheduled.

4.  Artificial Tears as needed.

5.  Aspirin 81 mg once daily.

6.  Coreg 3.125 mg twice daily.

7.  Vitamin D3 of 1000 units once daily.

8.  Diltiazem 180 mg once daily.

9.  Famotidine 20 mg once daily.

10.  Fish oil 1000 mg once daily.

11.  Furosemide 20 mg once daily.

12.  Levothyroxine 50 mcg once nightly.

13.  Claritin 10 mg once daily.

14.  Meloxicam 7.5 mg every other day.

15.  Potassium chloride 20 mEq once daily.

16.  Pravastatin 40 mg once nightly.

17.  Of note, patient also has hydralazine 25 mg 3 times daily as well as 
lisinopril 5 mg twice daily ordered, but has not been giving them due to 
borderline low blood pressure.

 

PHYSICAL EXAMINATION:

VITAL SIGNS:  Temperature is 98.8, blood pressure is 138/51, heart rate of 70, 
respirations 18, O2 saturation 95% on 2 liters.  Her weight is 111 pounds.

GENERAL:  She is alert and oriented x3.  She is in no apparent distress.  She 
is calm and cooperative.

HEENT:  Pupils are equally round and reactive to light.  Sclerae are 
nonicteric.  Conjunctivae are non-hyperemic.  Oropharynx is moist without 
erythema or exudates.

NECK:  Supple, without thyromegaly or lymphadenopathy.

CARDIOVASCULAR:  Heart regular rate and rhythm with normal S1 and S2 with no 
murmurs.

LUNGS:  Clear to auscultation bilaterally.  No wheezing or rhonchi.

ABDOMEN:  Soft, nontender, nondistended with normoactive bowel sounds.

EXTREMITIES:  Negative for clubbing, cyanosis or edema.

NEUROLOGIC:  Cranial nerves II-XII are grossly intact.  No focal deficits.

SKIN: Warm and Dry, no rashes

 

LABORATORY AND X-RAY FINDINGS:  None at this time.

 

ASSESSMENT AND PLAN:

1.  Generalized weakness and deconditioning: Status post hospitalization for 
acute metabolic encephalopathy and congestive heart failure exacerbation as 
well as pneumonia.  Physical Therapy and Occupational Therapy will be consulted 
and will begin treatment likely in the morning.  The patient is not at her 
baseline at this time.  I do anticipate that she will need at least 1-2 weeks 
of physical therapy before she can return home safely.  She does live 
independently and at present has no home healthcare or caregivers and therefore 
will need to be back at her baseline.

2.  Community-acquired pneumonia.  The patient is status post 7 days of 
Levaquin.  She has no further respiratory issues at this time and the pneumonia 
has been completely treated.

3.  New diagnosis of congestive heart failure with systolic dysfunction and 
ejection fraction of 25-30%.  The patient's cardiologist followed her while she 
is in the hospital and she was started on furosemide which has worked well.  
This will be continued to manage on an outpatient basis and I do anticipate her 
being discharged to outpatient cardiac rehabilitation once ready.

4.  Chronic atrial fibrillation with recent rapid ventricular response, status 
post unsuccessful electrical cardioversion.  The patient was started on 
Cardizem and Coreg while in the hospital.  She seems to be tolerating these 
well with no further issues or tachycardia, but has had some borderline low 
blood pressures.  We will be monitoring her blood pressure closely and 
adjusting her medications as needed.

5.  Hypertension.  As above.  Medications will be monitored and adjusted as 
needed to keep blood pressure at goal.

6.  Suspected chronic obstructive pulmonary disease.  The patient will be 
continued on albuterol, ipratropium as needed.  We will likely need further 
outpatient pulmonary function testing and management upon discharge.

7.  Chronic kidney disease. We will check her kidney function in the morning 
just for monitoring and then once weekly thereafter.

8.  Deep venous thrombosis prophylaxis.  Patient is on Eliquis and aspirin.

9.  Code status:  The patient is DNR.

 

DISPOSITION:  Anticipate the patient needing at least 1-2 weeks of physical 
therapy and occupational therapy before she can return home to independent 
living with anticipated further cardiac rehab and possibly the home health care 
upon discharge.

 

CASSIDY complains of pain/discomfort

## 2019-06-27 NOTE — CT
EXAM: CT chest without contrast per low-dose cancer screening protocol



HISTORY: History of smoking and nicotine dependence. Greater than 50 pack year smoking history.



COMPARISON: None



TECHNIQUE: Multiple contiguous axial images were obtained in a CT of the chest without contrast per l
ow-dose cancer screening protocol. Sagittal and coronal reformats were performed.



FINDINGS: 

Pulmonary nodules: A tiny calcified granuloma is seen on image 21 of 63 in the right upper lobe. No s
uspicious pulmonary nodules are seen. No focal infiltrates are seen. Scarring is seen in the right

lung base and in the lingula. Emphysematous changes are seen in the lungs.

Pleural space: No pneumothorax or pleural effusion are seen. 



Heart: The heart is normal in size. Calcifications are seen in the coronary arteries and aorta.

Mediastinum: No hilar or mediastinal lymphadenopathy appreciated on this limited noncontrast examinat
ion.



Bones: Degenerative changes in the spine.



Visualized subdiaphragmatic structures: Unremarkable.



IMPRESSION:

Lung RADS category 1-negative.



Reported By: Josh Duron 

Electronically Signed:  6/27/2019 11:42 AM

## 2019-07-11 NOTE — MMO
Bilateral MAMMO Bilat Screen [facility].

 

CLINICAL HISTORY:

Patient is 79 years old and is seen for screening. The patient has no family

history of breast cancer.  The patient has no personal history of cancer.

 

VIEWS:

The views performed were:  bilateral craniocaudal with tomosynthesis and

bilateral mediolateral oblique with tomosynthesis.

 

FILMS COMPARED:

The present examination has been compared to prior imaging studies performed at

[facility] on 06/28/2018, and at [facility] on

01/15/2015, 01/21/2016 and 01/26/2017.

 

MAMMOGRAM FINDINGS:

The breasts are heterogeneously dense, which could obscure a lesion on

mammography.

 

There are no suspicious masses, suspicious calcifications, or new areas of

architectural distortion.

 

IMPRESSION:

THERE IS NO MAMMOGRAPHIC EVIDENCE OF MALIGNANCY.

 

A ROUTINE FOLLOW-UP MAMMOGRAM IN 1 YEAR IS RECOMMENDED.

 

THE RESULTS OF THIS EXAM WERE SENT TO THE PATIENT.

 

ACR BI-RADS Category 1 - Negative

 

MAMMOGRAPHY NOTE:

 1. A negative mammogram report should not delay a biopsy if a dominant of

 clinically suspicious mass is present.

 2. Approximately 10% to 15% of breast cancers are not detected by

 mammography.

 3. Adenosis and dense breasts may obscure an underlying neoplasm.

 

 

Reported by: MIKKI BROWN MD    Electonically Signed: [id number]